# Patient Record
Sex: FEMALE | Race: BLACK OR AFRICAN AMERICAN | NOT HISPANIC OR LATINO
[De-identification: names, ages, dates, MRNs, and addresses within clinical notes are randomized per-mention and may not be internally consistent; named-entity substitution may affect disease eponyms.]

---

## 2018-06-07 ENCOUNTER — NON-APPOINTMENT (OUTPATIENT)
Age: 63
End: 2018-06-07

## 2018-06-07 ENCOUNTER — APPOINTMENT (OUTPATIENT)
Dept: CARDIOLOGY | Facility: CLINIC | Age: 63
End: 2018-06-07
Payer: MEDICAID

## 2018-06-07 VITALS
WEIGHT: 293 LBS | BODY MASS INDEX: 55.32 KG/M2 | SYSTOLIC BLOOD PRESSURE: 130 MMHG | OXYGEN SATURATION: 97 % | DIASTOLIC BLOOD PRESSURE: 80 MMHG | RESPIRATION RATE: 14 BRPM | HEIGHT: 61 IN | HEART RATE: 60 BPM

## 2018-06-07 VITALS — WEIGHT: 293 LBS | HEIGHT: 61 IN | BODY MASS INDEX: 55.32 KG/M2

## 2018-06-07 DIAGNOSIS — Z87.891 PERSONAL HISTORY OF NICOTINE DEPENDENCE: ICD-10-CM

## 2018-06-07 DIAGNOSIS — E66.01 MORBID (SEVERE) OBESITY DUE TO EXCESS CALORIES: ICD-10-CM

## 2018-06-07 DIAGNOSIS — Z87.898 PERSONAL HISTORY OF OTHER SPECIFIED CONDITIONS: ICD-10-CM

## 2018-06-07 PROCEDURE — 99214 OFFICE O/P EST MOD 30 MIN: CPT

## 2018-06-07 PROCEDURE — 93306 TTE W/DOPPLER COMPLETE: CPT

## 2018-06-07 PROCEDURE — 93000 ELECTROCARDIOGRAM COMPLETE: CPT

## 2018-06-07 RX ORDER — ALBUTEROL SULFATE 90 UG/1
108 AEROSOL, METERED RESPIRATORY (INHALATION) EVERY 6 HOURS
Refills: 0 | Status: ACTIVE | COMMUNITY

## 2018-09-11 ENCOUNTER — EMERGENCY (EMERGENCY)
Facility: HOSPITAL | Age: 63
LOS: 1 days | Discharge: HOME | End: 2018-09-11
Attending: EMERGENCY MEDICINE | Admitting: EMERGENCY MEDICINE
Payer: MEDICAID

## 2018-09-11 ENCOUNTER — APPOINTMENT (OUTPATIENT)
Dept: CARDIOLOGY | Facility: CLINIC | Age: 63
End: 2018-09-11
Payer: MEDICAID

## 2018-09-11 ENCOUNTER — NON-APPOINTMENT (OUTPATIENT)
Age: 63
End: 2018-09-11

## 2018-09-11 VITALS
RESPIRATION RATE: 18 BRPM | SYSTOLIC BLOOD PRESSURE: 126 MMHG | TEMPERATURE: 99 F | OXYGEN SATURATION: 99 % | HEART RATE: 90 BPM | DIASTOLIC BLOOD PRESSURE: 99 MMHG

## 2018-09-11 VITALS
TEMPERATURE: 98 F | SYSTOLIC BLOOD PRESSURE: 115 MMHG | OXYGEN SATURATION: 100 % | RESPIRATION RATE: 18 BRPM | DIASTOLIC BLOOD PRESSURE: 72 MMHG | HEART RATE: 80 BPM

## 2018-09-11 VITALS
HEART RATE: 87 BPM | BODY MASS INDEX: 43.99 KG/M2 | SYSTOLIC BLOOD PRESSURE: 80 MMHG | WEIGHT: 233 LBS | DIASTOLIC BLOOD PRESSURE: 52 MMHG | HEIGHT: 61 IN

## 2018-09-11 VITALS — SYSTOLIC BLOOD PRESSURE: 136 MMHG | DIASTOLIC BLOOD PRESSURE: 98 MMHG

## 2018-09-11 VITALS — SYSTOLIC BLOOD PRESSURE: 90 MMHG | DIASTOLIC BLOOD PRESSURE: 62 MMHG

## 2018-09-11 DIAGNOSIS — Z98.84 BARIATRIC SURGERY STATUS: Chronic | ICD-10-CM

## 2018-09-11 DIAGNOSIS — Z86.79 PERSONAL HISTORY OF OTHER DISEASES OF THE CIRCULATORY SYSTEM: ICD-10-CM

## 2018-09-11 DIAGNOSIS — Z01.818 ENCOUNTER FOR OTHER PREPROCEDURAL EXAMINATION: ICD-10-CM

## 2018-09-11 LAB
ALBUMIN SERPL ELPH-MCNC: 3.7 G/DL — SIGNIFICANT CHANGE UP (ref 3.3–5)
ALP SERPL-CCNC: 44 U/L — SIGNIFICANT CHANGE UP (ref 40–120)
ALT FLD-CCNC: 22 U/L — SIGNIFICANT CHANGE UP (ref 4–33)
AST SERPL-CCNC: 49 U/L — HIGH (ref 4–32)
BASE EXCESS BLDV CALC-SCNC: 3.8 MMOL/L — SIGNIFICANT CHANGE UP
BASOPHILS # BLD AUTO: 0.02 K/UL — SIGNIFICANT CHANGE UP (ref 0–0.2)
BASOPHILS NFR BLD AUTO: 0.3 % — SIGNIFICANT CHANGE UP (ref 0–2)
BILIRUB SERPL-MCNC: 1.5 MG/DL — HIGH (ref 0.2–1.2)
BLOOD GAS VENOUS - CREATININE: 1.86 MG/DL — HIGH (ref 0.5–1.3)
BUN SERPL-MCNC: 36 MG/DL — HIGH (ref 7–23)
CALCIUM SERPL-MCNC: 9.6 MG/DL — SIGNIFICANT CHANGE UP (ref 8.4–10.5)
CHLORIDE BLDV-SCNC: 115 MMOL/L — HIGH (ref 96–108)
CHLORIDE SERPL-SCNC: 109 MMOL/L — HIGH (ref 98–107)
CO2 SERPL-SCNC: 25 MMOL/L — SIGNIFICANT CHANGE UP (ref 22–31)
CREAT SERPL-MCNC: 1.92 MG/DL — HIGH (ref 0.5–1.3)
EOSINOPHIL # BLD AUTO: 0.01 K/UL — SIGNIFICANT CHANGE UP (ref 0–0.5)
EOSINOPHIL NFR BLD AUTO: 0.1 % — SIGNIFICANT CHANGE UP (ref 0–6)
GAS PNL BLDV: 150 MMOL/L — HIGH (ref 136–146)
GLUCOSE BLDV-MCNC: 86 — SIGNIFICANT CHANGE UP (ref 70–99)
GLUCOSE SERPL-MCNC: 86 MG/DL — SIGNIFICANT CHANGE UP (ref 70–99)
HCO3 BLDV-SCNC: 26 MMOL/L — SIGNIFICANT CHANGE UP (ref 20–27)
HCT VFR BLD CALC: 45.2 % — HIGH (ref 34.5–45)
HCT VFR BLDV CALC: 46 % — HIGH (ref 34.5–45)
HGB BLD-MCNC: 14.5 G/DL — SIGNIFICANT CHANGE UP (ref 11.5–15.5)
HGB BLDV-MCNC: 15 G/DL — SIGNIFICANT CHANGE UP (ref 11.5–15.5)
IMM GRANULOCYTES # BLD AUTO: 0.01 # — SIGNIFICANT CHANGE UP
IMM GRANULOCYTES NFR BLD AUTO: 0.1 % — SIGNIFICANT CHANGE UP (ref 0–1.5)
LACTATE BLDV-MCNC: 3.4 MMOL/L — HIGH (ref 0.5–2)
LYMPHOCYTES # BLD AUTO: 2.2 K/UL — SIGNIFICANT CHANGE UP (ref 1–3.3)
LYMPHOCYTES # BLD AUTO: 32.4 % — SIGNIFICANT CHANGE UP (ref 13–44)
MCHC RBC-ENTMCNC: 31.3 PG — SIGNIFICANT CHANGE UP (ref 27–34)
MCHC RBC-ENTMCNC: 32.1 % — SIGNIFICANT CHANGE UP (ref 32–36)
MCV RBC AUTO: 97.4 FL — SIGNIFICANT CHANGE UP (ref 80–100)
MONOCYTES # BLD AUTO: 0.98 K/UL — HIGH (ref 0–0.9)
MONOCYTES NFR BLD AUTO: 14.5 % — HIGH (ref 2–14)
NEUTROPHILS # BLD AUTO: 3.56 K/UL — SIGNIFICANT CHANGE UP (ref 1.8–7.4)
NEUTROPHILS NFR BLD AUTO: 52.6 % — SIGNIFICANT CHANGE UP (ref 43–77)
NRBC # FLD: 0.02 — SIGNIFICANT CHANGE UP
PCO2 BLDV: 46 MMHG — SIGNIFICANT CHANGE UP (ref 41–51)
PH BLDV: 7.4 PH — SIGNIFICANT CHANGE UP (ref 7.32–7.43)
PLATELET # BLD AUTO: 133 K/UL — LOW (ref 150–400)
PMV BLD: 13.9 FL — HIGH (ref 7–13)
PO2 BLDV: 36 MMHG — SIGNIFICANT CHANGE UP (ref 35–40)
POTASSIUM BLDV-SCNC: 2.9 MMOL/L — CRITICAL LOW (ref 3.4–4.5)
POTASSIUM SERPL-MCNC: 3.2 MMOL/L — LOW (ref 3.5–5.3)
POTASSIUM SERPL-SCNC: 3.2 MMOL/L — LOW (ref 3.5–5.3)
PROT SERPL-MCNC: 7.8 G/DL — SIGNIFICANT CHANGE UP (ref 6–8.3)
RBC # BLD: 4.64 M/UL — SIGNIFICANT CHANGE UP (ref 3.8–5.2)
RBC # FLD: 17.4 % — HIGH (ref 10.3–14.5)
SAO2 % BLDV: 61.7 % — SIGNIFICANT CHANGE UP (ref 60–85)
SODIUM SERPL-SCNC: 154 MMOL/L — HIGH (ref 135–145)
TROPONIN T, HIGH SENSITIVITY: 42 NG/L — SIGNIFICANT CHANGE UP (ref ?–14)
WBC # BLD: 6.78 K/UL — SIGNIFICANT CHANGE UP (ref 3.8–10.5)
WBC # FLD AUTO: 6.78 K/UL — SIGNIFICANT CHANGE UP (ref 3.8–10.5)

## 2018-09-11 PROCEDURE — 36000 PLACE NEEDLE IN VEIN: CPT

## 2018-09-11 PROCEDURE — 99285 EMERGENCY DEPT VISIT HI MDM: CPT | Mod: 25

## 2018-09-11 PROCEDURE — 93000 ELECTROCARDIOGRAM COMPLETE: CPT

## 2018-09-11 PROCEDURE — 93010 ELECTROCARDIOGRAM REPORT: CPT

## 2018-09-11 PROCEDURE — 99214 OFFICE O/P EST MOD 30 MIN: CPT

## 2018-09-11 PROCEDURE — 74176 CT ABD & PELVIS W/O CONTRAST: CPT | Mod: 26

## 2018-09-11 PROCEDURE — 76937 US GUIDE VASCULAR ACCESS: CPT | Mod: 26,LT

## 2018-09-11 RX ORDER — SODIUM CHLORIDE 9 MG/ML
1000 INJECTION INTRAMUSCULAR; INTRAVENOUS; SUBCUTANEOUS ONCE
Qty: 0 | Refills: 0 | Status: COMPLETED | OUTPATIENT
Start: 2018-09-11 | End: 2018-09-11

## 2018-09-11 RX ORDER — ONDANSETRON 8 MG/1
4 TABLET, FILM COATED ORAL ONCE
Qty: 0 | Refills: 0 | Status: COMPLETED | OUTPATIENT
Start: 2018-09-11 | End: 2018-09-11

## 2018-09-11 RX ORDER — POTASSIUM CHLORIDE 20 MEQ
40 PACKET (EA) ORAL ONCE
Qty: 0 | Refills: 0 | Status: COMPLETED | OUTPATIENT
Start: 2018-09-11 | End: 2018-09-11

## 2018-09-11 RX ORDER — METOPROLOL TARTRATE 25 MG/1
25 TABLET, FILM COATED ORAL TWICE DAILY
Refills: 0 | Status: DISCONTINUED | COMMUNITY
End: 2018-09-11

## 2018-09-11 RX ORDER — ENALAPRIL MALEATE 20 MG/1
20 TABLET ORAL DAILY
Refills: 0 | Status: DISCONTINUED | COMMUNITY
End: 2018-09-11

## 2018-09-11 RX ADMIN — SODIUM CHLORIDE 1000 MILLILITER(S): 9 INJECTION INTRAMUSCULAR; INTRAVENOUS; SUBCUTANEOUS at 15:45

## 2018-09-11 RX ADMIN — SODIUM CHLORIDE 1000 MILLILITER(S): 9 INJECTION INTRAMUSCULAR; INTRAVENOUS; SUBCUTANEOUS at 18:36

## 2018-09-11 RX ADMIN — ONDANSETRON 4 MILLIGRAM(S): 8 TABLET, FILM COATED ORAL at 15:45

## 2018-09-11 NOTE — ED PROVIDER NOTE - DATE/TIME 5
FACULTY SIGN-OUT  ADDENDUM     Care of this patient was assumed from Dr. Pebbles Nicole. The patient was seen for Fatigue and Fall  . The patient's initial evaluation and plan have been discussed with the prior provider who initially evaluated the patient. Nursing Notes, Past Medical Hx, Past Surgical Hx, Social Hx, Allergies, and Family Hx were all reviewed. CHIEF COMPLAINT       Chief Complaint   Patient presents with    Fatigue    Fall         PAST MEDICAL HISTORY    has a past medical history of Atrial fibrillation ([address] CAD (coronary artery disease); Esophageal reflux; Other and unspecified hyperlipidemia; Other primary cardiomyopathies; Type II or unspecified type diabetes mellitus without mention of complication, not stated as uncontrolled; Unspecified asthma; and Unspecified essential hypertension. SURGICAL HISTORY      has a past surgical history that includes Coronary artery bypass graft and pacemaker placement.     CURRENT MEDICATIONS       Previous Medications    ACETAMINOPHEN-CODEINE (TYLENOL #3) 300-30 MG PER TABLET        BUDESONIDE-FORMOTEROL (SYMBICORT) 160-4.5 MCG/ACT AERO    Inhale 2 puffs into the lungs 2 times daily    CALCIUM CARBONATE-VITAMIN D (CALCIUM + D PO)    Take by mouth    CARVEDILOL (COREG) 6.25 MG TABLET    TAKE (1) TABLET EVERY MORNING AND EVENING    CHOLECALCIFEROL (VITAMIN D3) 2000 UNITS CAPS    Take by mouth    DOCUSATE SODIUM (COLACE) 100 MG CAPSULE    Take 100 mg by mouth as needed for Constipation    ELIQUIS 5 MG TABS TABLET    TAKE (1) TABLET EVERY MORNING AND EVENING    FLUTICASONE (FLONASE) 50 MCG/ACT NASAL SPRAY    1 spray by Nasal route daily    KETOCONAZOLE (NIZORAL) 2 % CREAM        MOMETASONE-FORMOTEROL (DULERA) 200-5 MCG/ACT INHALER    Inhale 2 puffs into the lungs every 12 hours    OMEPRAZOLE (PRILOSEC) 20 MG DELAYED RELEASE CAPSULE        PRAVASTATIN (PRAVACHOL) 80 MG TABLET        SPIRONOLACTONE (ALDACTONE) 25 MG TABLET           ALLERGIES     is allergic evidence of an acute infarct. Encephalomalacia along the anterior right centrum semi ovale suggesting remote infarct. There is no evidence of hydrocephalus. ORBITS: The visualized portion of the orbits demonstrate no acute abnormality. SINUSES: Moderate mucosal thickening of the left sphenoid sinus and mild mucosal thickening within the right frontal sinus suggesting chronic sinusitis. SOFT TISSUES/SKULL:  No acute abnormality of the visualized skull or soft tissues. No acute intracranial abnormality with chronic findings as described. Ct Chest W Contrast    Result Date: 10/1/2017  EXAMINATION: CT OF THE CHEST WITH CONTRAST, 10/1/2017 6:18 pm TECHNIQUE: CT of the chest was performed with the administration of intravenous contrast. Multiplanar reformatted images are provided for review. Dose modulation, iterative reconstruction, and/or weight based adjustment of the mA/kV was utilized to reduce the radiation dose to as low as reasonably achievable. COMPARISON: None HISTORY: ORDERING SYSTEM PROVIDED HISTORY: possible mass on xray TECHNOLOGIST PROVIDED HISTORY: Ordering Physician Provided Reason for Exam: Possibly mass found on X-ray chest. Patient c/o mild SOB/ fatigue and frequent falls Acuity: Acute Type of Exam: Initial FINDINGS: Mediastinum: Heart is enlarged. Status post CABG. Thoracic aorta is unremarkable. No enlarged mediastinal lymph nodes. Lungs/pleura: No pneumothorax. No pleural effusion. No focal consolidation. Central airways are patent. Upper Abdomen: Cholecystectomy. Otherwise, within normal limits. Soft Tissues/Bones: No acute finding. 1. No focal airspace disease. No pulmonary mass identified. 2. Cardiomegaly. Xr Chest Portable    Result Date: 10/1/2017  EXAMINATION: SINGLE VIEW OF THE CHEST 10/1/2017 5:01 pm COMPARISON: None.  HISTORY: ORDERING SYSTEM PROVIDED HISTORY: falls TECHNOLOGIST PROVIDED HISTORY: Reason for exam:->falls Ordering Physician Provided Reason for Exam: pt c/o fatigue Acuity: Acute Type of Exam: Initial Additional signs and symptoms: mild sob FINDINGS: There is a bipolar pacer on the left. Sternotomy wires are noted. There is mild cardiomegaly. There is fullness of the superior mediastinum on the right. The bony thorax is intact. Possible superior mediastinal mass. Recommend follow-up CT chest with IV contrast. Status post CABG and pacer.          LABS:  Results for orders placed or performed during the hospital encounter of 10/01/17   CBC Auto Differential   Result Value Ref Range    WBC 6.1 3.5 - 11.0 k/uL    RBC 4.29 4.0 - 5.2 m/uL    Hemoglobin 13.2 12.0 - 16.0 g/dL    Hematocrit 39.2 36 - 46 %    MCV 91.4 80 - 100 fL    MCH 30.9 26 - 34 pg    MCHC 33.8 31 - 37 g/dL    RDW 13.5 12.5 - 15.4 %    Platelets 545 747 - 867 k/uL    MPV 7.9 6.0 - 12.0 fL    Differential Type NOT REPORTED     Seg Neutrophils 61 %    Lymphocytes 25 %    Monocytes 11 %    Eosinophils % 2 %    Basophils 1 %    Segs Absolute 3.70 1.8 - 7.7 k/uL    Absolute Lymph # 1.50 1.0 - 4.8 k/uL    Absolute Mono # 0.60 0.1 - 1.2 k/uL    Absolute Eos # 0.10 0.0 - 0.4 k/uL    Basophils # 0.10 0.0 - 0.2 k/uL    WBC Morphology NOT REPORTED     RBC Morphology NOT REPORTED     Platelet Estimate NOT REPORTED    Basic Metabolic Panel   Result Value Ref Range    Glucose 116 (H) 70 - 99 mg/dL    BUN 20 8 - 23 mg/dL    CREATININE 0.80 0.50 - 0.90 mg/dL    Bun/Cre Ratio NOT REPORTED 9 - 20    Calcium 9.8 8.6 - 10.4 mg/dL    Sodium 139 135 - 144 mmol/L    Potassium 4.3 3.7 - 5.3 mmol/L    Chloride 97 (L) 98 - 107 mmol/L    CO2 29 20 - 31 mmol/L    Anion Gap 13 9 - 17 mmol/L    GFR Non-African American >60 >60 mL/min    GFR African American >60 >60 mL/min    GFR Comment          GFR Staging NOT REPORTED    Protime-INR   Result Value Ref Range    Protime 11.3 9.4 - 12.6 sec    INR 1.1    APTT   Result Value Ref Range    PTT 28.8 21.3 - 31.3 sec   Troponin   Result Value Ref Range    Troponin T <0.03 <0.03 ng/mL with a voice recognition program.  Efforts were made to edit the dictations but occasionally words are mis-transcribed.)        Kleber Richardson D.O.   Emergency Medicine Attending       Adele Goldberg DO  10/01/17 [id number] 12-Sep-2018 00:51

## 2018-09-11 NOTE — ED PROVIDER NOTE - MEDICAL DECISION MAKING DETAILS
Patient is 63 year old female with recent gastric sleeve presenting with hypotension and lightheadedness. Consistent with hypovolemia likely due to dehydration, but need to evaluate for any other causes, such as bleed, sepsis, or surgical cx. Give IVF and order basic labs to evaluate for infection and electrolyte status. Give zofran for antinausea. Will Reassess. Surgery should be contacted for further dispo

## 2018-09-11 NOTE — ED PROVIDER NOTE - ATTENDING CONTRIBUTION TO CARE
DR. COX, ATTENDING MD-  I performed a face to face bedside interview with patient regarding history of present illness, review of symptoms and past medical history. I completed an independent physical exam.  I have discussed patient's plan of care with the resident.   Documentation as above in the note.    64 y/o female h/o gastric sleeve few months prior at [facility] here from cardiologist's office with transient hypotension there and c/o n/v daily since her surgery.  States she has been f/u with her bariatric surgeon every few weeks d/t the n/v and told it was an expected se of her sx.  Denies f/c, ha, neck stiffness, cp, sob, cough, diarrhea, dysuria, rash.  Afebrile, vs wnl, dry mm, anxious, obese, ctabil, s1s2 rrr no m/r/g, abd soft mild epig ttp no r/g, no cva tenderness b/l, no leg swelling b/l, no rash.  Transient low bp at office, normotensive en route by EMS and here at [facility] ED.  Likely hypovolemia d/t dec po intake and vomiting.  Eval for complication of gastric sleeve, lyte abn.  Obtain cbc, cmp, ct a/p, cxr, ua, give ivf bolus, antiemetic, reassess.  Will need to pass po challenge prior to dc.

## 2018-09-11 NOTE — ED PROVIDER NOTE - OBJECTIVE STATEMENT
Patient is 63 y female with recent bariatric surgery. Patient is 63 y female with recent bariatric surgery presenting with hypotension and lightheadedness today. Patient was a cardiology f/u where BP was measuring 80s/40s and was sent to ED for further evaluation. Patient also has felt lightheaded and cold. She had recent bariatric surgery in July where she lost 70 lbs, and has had minimal PO intake since then. She also endorses several episodes of vomiting today, and has vomiting occasionally every day. Denies any CP, SOB, or LOC.     PMHx of MI/angina with flipped T waves. Patient is 63 y female with recent bariatric surgery presenting with hypotension and lightheadedness today. Patient was at cardiology f/u where BP was measuring 80s/40s and was sent to ED for further evaluation. Patient also has felt lightheaded and cold. She had recent bariatric surgery in July where she lost 70 lbs, and has had minimal PO intake since then. She also endorses several episodes of vomiting today, and has vomiting occasionally every day. Denies any CP, SOB, or LOC.     PMHx of MI/angina with flipped T waves. Patient is 63 y female with recent bariatric surgery presenting with hypotension and lightheadedness today. Patient was at cardiology f/u where BP was measuring 80s/40s and was sent to ED for further evaluation. Patient also has felt lightheaded and cold. She had recent bariatric surgery in July where she lost 70 lbs, and has had minimal PO intake since then. She also endorses several episodes of vomiting today, and has vomiting occasionally every day. Denies any CP, SOB, or LOC.     PMHx of MI/angina with flipped T waves, Cardiologist states unchanged from baseline.

## 2018-09-11 NOTE — ED ADULT TRIAGE NOTE - CHIEF COMPLAINT QUOTE
Pt brought in by EMS from cardiologist office for hypotension, as per staff at office pts BP was 80/57, on ems arrival /92. Pt denies chest pain, sob, n/v/d, fever or chills.

## 2018-09-11 NOTE — ED PROCEDURE NOTE - PROCEDURE ADDITIONAL DETAILS
20 gauge IV placed under ultrasound guidance using clean conditions after location of vein with b-mode ultrasound. Vascular access performed using dynamic guidance with direct visualization of needle entering vein and location and patency ascertained by direct visualization of catheter in vein and functional catheter.  US performed after to ascertain fro complications. Successful peripheral venous access performed in left AC. No complications.  See image(s) and report in chart.  [place] [id number]

## 2018-09-11 NOTE — ED PROVIDER NOTE - PROGRESS NOTE DETAILS
Erik Avila PGY1  Spoke to Cardiologist Dr. Bergman who said EKG was unchanged Erik Avila PGY1  Patient declined any oral meds, including oral or IV potassium. Erik Avila PGY1  Patient declined any oral meds, including oral or IV potassium, but understands risk of cardiac dysrhythmia with low potassium. Erik Avila PGY1  Spoke to surgery, states patient requires transfer for admission, as she is a bariatric patient. MD COX:  Pt states she continues to feel nauseated, will try to take some apple juice.  Pending CT and likely xfer to [place] for dec po intake, shonna, lyte abn.  Pt agrees to xfer. Discussed case with Dr. Back [facility] surgeon who instructs to transfer to [facility].  Patient refusing transfer to [facility].  Patient needs admission for electrolyte repletion and Nadya.  Discussed case with Dr. Robison and Dr. Moctezuma.

## 2018-09-12 ENCOUNTER — INPATIENT (INPATIENT)
Facility: HOSPITAL | Age: 63
LOS: 0 days | Discharge: LEFT BEFORE DISCHARGE | DRG: 392 | End: 2018-09-13
Attending: INTERNAL MEDICINE | Admitting: STUDENT IN AN ORGANIZED HEALTH CARE EDUCATION/TRAINING PROGRAM
Payer: MEDICAID

## 2018-09-12 VITALS
SYSTOLIC BLOOD PRESSURE: 91 MMHG | OXYGEN SATURATION: 98 % | TEMPERATURE: 98 F | HEART RATE: 65 BPM | DIASTOLIC BLOOD PRESSURE: 56 MMHG | RESPIRATION RATE: 18 BRPM

## 2018-09-12 VITALS
TEMPERATURE: 98 F | DIASTOLIC BLOOD PRESSURE: 68 MMHG | RESPIRATION RATE: 18 BRPM | OXYGEN SATURATION: 96 % | HEART RATE: 75 BPM | SYSTOLIC BLOOD PRESSURE: 133 MMHG

## 2018-09-12 DIAGNOSIS — E87.0 HYPEROSMOLALITY AND HYPERNATREMIA: ICD-10-CM

## 2018-09-12 DIAGNOSIS — N17.9 ACUTE KIDNEY FAILURE, UNSPECIFIED: ICD-10-CM

## 2018-09-12 DIAGNOSIS — R11.10 VOMITING, UNSPECIFIED: ICD-10-CM

## 2018-09-12 DIAGNOSIS — E87.6 HYPOKALEMIA: ICD-10-CM

## 2018-09-12 DIAGNOSIS — Z29.9 ENCOUNTER FOR PROPHYLACTIC MEASURES, UNSPECIFIED: ICD-10-CM

## 2018-09-12 DIAGNOSIS — I10 ESSENTIAL (PRIMARY) HYPERTENSION: ICD-10-CM

## 2018-09-12 DIAGNOSIS — Z90.3 ACQUIRED ABSENCE OF STOMACH [PART OF]: Chronic | ICD-10-CM

## 2018-09-12 DIAGNOSIS — Z98.84 BARIATRIC SURGERY STATUS: Chronic | ICD-10-CM

## 2018-09-12 LAB
ALBUMIN SERPL ELPH-MCNC: 3.4 G/DL — SIGNIFICANT CHANGE UP (ref 3.3–5)
ALP SERPL-CCNC: 39 U/L — LOW (ref 40–120)
ALT FLD-CCNC: 18 U/L — SIGNIFICANT CHANGE UP (ref 10–45)
ANION GAP SERPL CALC-SCNC: 16 MMOL/L — SIGNIFICANT CHANGE UP (ref 5–17)
APTT BLD: 24.2 SEC — LOW (ref 27.5–37.4)
AST SERPL-CCNC: 43 U/L — HIGH (ref 10–40)
BASOPHILS # BLD AUTO: 0.1 K/UL — SIGNIFICANT CHANGE UP (ref 0–0.2)
BASOPHILS NFR BLD AUTO: 1.1 % — SIGNIFICANT CHANGE UP (ref 0–2)
BILIRUB SERPL-MCNC: 1.3 MG/DL — HIGH (ref 0.2–1.2)
BLD GP AB SCN SERPL QL: NEGATIVE — SIGNIFICANT CHANGE UP
BUN SERPL-MCNC: 28 MG/DL — HIGH (ref 7–23)
CALCIUM SERPL-MCNC: 9 MG/DL — SIGNIFICANT CHANGE UP (ref 8.4–10.5)
CHLORIDE SERPL-SCNC: 112 MMOL/L — HIGH (ref 96–108)
CO2 SERPL-SCNC: 25 MMOL/L — SIGNIFICANT CHANGE UP (ref 22–31)
CREAT SERPL-MCNC: 1.72 MG/DL — HIGH (ref 0.5–1.3)
EOSINOPHIL # BLD AUTO: 0.1 K/UL — SIGNIFICANT CHANGE UP (ref 0–0.5)
EOSINOPHIL NFR BLD AUTO: 1 % — SIGNIFICANT CHANGE UP (ref 0–6)
GLUCOSE SERPL-MCNC: 83 MG/DL — SIGNIFICANT CHANGE UP (ref 70–99)
HCT VFR BLD CALC: 41.9 % — SIGNIFICANT CHANGE UP (ref 34.5–45)
HGB BLD-MCNC: 14 G/DL — SIGNIFICANT CHANGE UP (ref 11.5–15.5)
INR BLD: 0.99 RATIO — SIGNIFICANT CHANGE UP (ref 0.88–1.16)
LYMPHOCYTES # BLD AUTO: 1.8 K/UL — SIGNIFICANT CHANGE UP (ref 1–3.3)
LYMPHOCYTES # BLD AUTO: 33.7 % — SIGNIFICANT CHANGE UP (ref 13–44)
MAGNESIUM SERPL-MCNC: 2.1 MG/DL — SIGNIFICANT CHANGE UP (ref 1.6–2.6)
MCHC RBC-ENTMCNC: 33 PG — SIGNIFICANT CHANGE UP (ref 27–34)
MCHC RBC-ENTMCNC: 33.4 GM/DL — SIGNIFICANT CHANGE UP (ref 32–36)
MCV RBC AUTO: 98.9 FL — SIGNIFICANT CHANGE UP (ref 80–100)
MONOCYTES # BLD AUTO: 0.7 K/UL — SIGNIFICANT CHANGE UP (ref 0–0.9)
MONOCYTES NFR BLD AUTO: 13.5 % — SIGNIFICANT CHANGE UP (ref 2–14)
NEUTROPHILS # BLD AUTO: 2.8 K/UL — SIGNIFICANT CHANGE UP (ref 1.8–7.4)
NEUTROPHILS NFR BLD AUTO: 50.7 % — SIGNIFICANT CHANGE UP (ref 43–77)
PLATELET # BLD AUTO: 117 K/UL — LOW (ref 150–400)
POTASSIUM SERPL-MCNC: 2.9 MMOL/L — CRITICAL LOW (ref 3.5–5.3)
POTASSIUM SERPL-SCNC: 2.9 MMOL/L — CRITICAL LOW (ref 3.5–5.3)
PROT SERPL-MCNC: 7.3 G/DL — SIGNIFICANT CHANGE UP (ref 6–8.3)
PROTHROM AB SERPL-ACNC: 10.8 SEC — SIGNIFICANT CHANGE UP (ref 9.8–12.7)
RBC # BLD: 4.24 M/UL — SIGNIFICANT CHANGE UP (ref 3.8–5.2)
RBC # FLD: 15.9 % — HIGH (ref 10.3–14.5)
RH IG SCN BLD-IMP: POSITIVE — SIGNIFICANT CHANGE UP
SODIUM SERPL-SCNC: 153 MMOL/L — HIGH (ref 135–145)
WBC # BLD: 5.4 K/UL — SIGNIFICANT CHANGE UP (ref 3.8–10.5)
WBC # FLD AUTO: 5.4 K/UL — SIGNIFICANT CHANGE UP (ref 3.8–10.5)

## 2018-09-12 PROCEDURE — 99223 1ST HOSP IP/OBS HIGH 75: CPT

## 2018-09-12 PROCEDURE — 99285 EMERGENCY DEPT VISIT HI MDM: CPT | Mod: 25

## 2018-09-12 RX ORDER — SODIUM CHLORIDE 9 MG/ML
1000 INJECTION, SOLUTION INTRAVENOUS
Qty: 0 | Refills: 0 | Status: DISCONTINUED | OUTPATIENT
Start: 2018-09-12 | End: 2018-09-13

## 2018-09-12 RX ORDER — CITALOPRAM 10 MG/1
20 TABLET, FILM COATED ORAL DAILY
Qty: 0 | Refills: 0 | Status: DISCONTINUED | OUTPATIENT
Start: 2018-09-12 | End: 2018-09-13

## 2018-09-12 RX ORDER — HYDROXYZINE HCL 10 MG
25 TABLET ORAL
Qty: 0 | Refills: 0 | Status: DISCONTINUED | OUTPATIENT
Start: 2018-09-12 | End: 2018-09-13

## 2018-09-12 RX ORDER — SUCRALFATE 1 G
1 TABLET ORAL ONCE
Qty: 0 | Refills: 0 | Status: COMPLETED | OUTPATIENT
Start: 2018-09-12 | End: 2018-09-12

## 2018-09-12 RX ORDER — BUDESONIDE AND FORMOTEROL FUMARATE DIHYDRATE 160; 4.5 UG/1; UG/1
2 AEROSOL RESPIRATORY (INHALATION)
Qty: 0 | Refills: 0 | Status: DISCONTINUED | OUTPATIENT
Start: 2018-09-12 | End: 2018-09-13

## 2018-09-12 RX ORDER — HEPARIN SODIUM 5000 [USP'U]/ML
5000 INJECTION INTRAVENOUS; SUBCUTANEOUS EVERY 8 HOURS
Qty: 0 | Refills: 0 | Status: DISCONTINUED | OUTPATIENT
Start: 2018-09-12 | End: 2018-09-13

## 2018-09-12 RX ORDER — ATORVASTATIN CALCIUM 80 MG/1
40 TABLET, FILM COATED ORAL AT BEDTIME
Qty: 0 | Refills: 0 | Status: DISCONTINUED | OUTPATIENT
Start: 2018-09-12 | End: 2018-09-13

## 2018-09-12 RX ORDER — DOCUSATE SODIUM 100 MG
100 CAPSULE ORAL
Qty: 0 | Refills: 0 | Status: DISCONTINUED | OUTPATIENT
Start: 2018-09-12 | End: 2018-09-13

## 2018-09-12 RX ORDER — PANTOPRAZOLE SODIUM 20 MG/1
40 TABLET, DELAYED RELEASE ORAL
Qty: 0 | Refills: 0 | Status: DISCONTINUED | OUTPATIENT
Start: 2018-09-12 | End: 2018-09-13

## 2018-09-12 RX ORDER — SUCRALFATE 1 G
1 TABLET ORAL
Qty: 0 | Refills: 0 | Status: DISCONTINUED | OUTPATIENT
Start: 2018-09-12 | End: 2018-09-13

## 2018-09-12 RX ORDER — POTASSIUM CHLORIDE 20 MEQ
10 PACKET (EA) ORAL
Qty: 0 | Refills: 0 | Status: DISCONTINUED | OUTPATIENT
Start: 2018-09-12 | End: 2018-09-13

## 2018-09-12 RX ORDER — FOLIC ACID 0.8 MG
1 TABLET ORAL DAILY
Qty: 0 | Refills: 0 | Status: DISCONTINUED | OUTPATIENT
Start: 2018-09-12 | End: 2018-09-13

## 2018-09-12 RX ORDER — RISPERIDONE 4 MG/1
1 TABLET ORAL
Qty: 0 | Refills: 0 | Status: DISCONTINUED | OUTPATIENT
Start: 2018-09-12 | End: 2018-09-13

## 2018-09-12 RX ORDER — ONDANSETRON 8 MG/1
4 TABLET, FILM COATED ORAL ONCE
Qty: 0 | Refills: 0 | Status: COMPLETED | OUTPATIENT
Start: 2018-09-12 | End: 2018-09-12

## 2018-09-12 RX ORDER — THIAMINE MONONITRATE (VIT B1) 100 MG
100 TABLET ORAL ONCE
Qty: 0 | Refills: 0 | Status: COMPLETED | OUTPATIENT
Start: 2018-09-12 | End: 2018-09-12

## 2018-09-12 RX ADMIN — SODIUM CHLORIDE 125 MILLILITER(S): 9 INJECTION, SOLUTION INTRAVENOUS at 08:12

## 2018-09-12 RX ADMIN — Medication 100 MILLIGRAM(S): at 08:13

## 2018-09-12 RX ADMIN — Medication 1 GRAM(S): at 15:57

## 2018-09-12 RX ADMIN — Medication 100 MILLIEQUIVALENT(S): at 12:50

## 2018-09-12 RX ADMIN — Medication 1 GRAM(S): at 01:18

## 2018-09-12 RX ADMIN — SODIUM CHLORIDE 150 MILLILITER(S): 9 INJECTION, SOLUTION INTRAVENOUS at 04:26

## 2018-09-12 RX ADMIN — HEPARIN SODIUM 5000 UNIT(S): 5000 INJECTION INTRAVENOUS; SUBCUTANEOUS at 18:55

## 2018-09-12 NOTE — CONSULT NOTE ADULT - SUBJECTIVE AND OBJECTIVE BOX
GENERAL SURGERY CONSULT NOTE  --------------------------------------------------------------------------------------------    HPI:  63F, non-compliant with history & physical. Upon encountering her the patient stated to me that she had answered the same questions too many times today, and would not answer my questions or allow me to examen her.    Per her recent ED visit at [facility]...  63F with recent sleeve gastrectomy (7/18 at [facility]) presenting with lightheadedness and inability to tolerate PO for months. Patient was at cardiology f/u where BP was measuring 80s/40s and was sent to ED for further evaluation. Patient also has felt lightheaded and cold. She reports having lost 70 lbs, and has had minimal PO intake since then. She also endorses several episodes of vomiting today, and has vomiting occasionally every day. Denies any CP, SOB, or LOC.     ROS: 10-system review is otherwise negative except HPI above.      PAST MEDICAL & SURGICAL HISTORY:    FAMILY HISTORY:  [] Family history not pertinent as reviewed with the patient and family      ALLERGIES: tetracycline (Hives)      CURRENT MEDICATIONS  MEDICATIONS (STANDING): lactated ringers. 1000 milliLiter(s) IV Continuous <Continuous>    MEDICATIONS (PRN):  --------------------------------------------------------------------------------------------    Vitals:   T(C): 36.6 (09-12-18 @ 03:38), Max: 36.6 (09-12-18 @ 03:38)  HR: 65 (09-12-18 @ 03:38) (65 - 65)  BP: 91/56 (09-12-18 @ 03:38) (91/56 - 91/56)  RR: 18 (09-12-18 @ 03:38) (18 - 18)  SpO2: 98% (09-12-18 @ 03:38) (98% - 98%)  CAPILLARY BLOOD GLUCOSE      PHYSICAL EXAM:  Patient declined to be examined   --------------------------------------------------------------------------------------------    LABS    From [facility]...    WBC 6.8  Hgb 14.5  Hct 45  Plt 133    Na 154  K 3.2  Cl 109  CO2 25  BUN 36  Cr 1.92          --------------------------------------------------------------------------------------------    MICROBIOLOGY      --------------------------------------------------------------------------------------------    IMAGING  CT a/p done at [facility], images reviewed, no acute pathology.

## 2018-09-12 NOTE — ED PROVIDER NOTE - OBJECTIVE STATEMENT
63yof s/p gastric sleeve in July 2018 at [facility] has had intractable nausea and vomiting for weeks to months, was sent to [facility] from PMD office for hypotension. Had BPs in the 80s and acute kidney injury, presumed from dehydration. Transferred to [facility] for admission and bariatrics consult. Pt denies any pain but endorses significant nausea and abdominal fullness w/ any PO intake. No chest pain shortness of breath or syncope.

## 2018-09-12 NOTE — DISCHARGE NOTE ADULT - PLAN OF CARE
Continue your home blood pressure medications   and follow up with your primary care doctor Nausea and Vomiting will be resolved FOLLOW UP WITH YOUR GASTROENTEROLOGIST AND BARIATRIC SURGEON AND PRIMARY DOCTOR AS SOON AS POSSIBLE THIS WEEK. Blood pressure will be controlled

## 2018-09-12 NOTE — CONSULT NOTE ADULT - ASSESSMENT
ASSESSMENT: Patient is a 63F with inability to tolerate much PO and lightheadedness for months since sleeve gastrectomy done in July of this year.    PLAN:   - No surgical interventions indicated at this time  - Would recommend patient follow up with her surgeon    Will discuss with MIS fellow    Jose Virk, PGY-2  [facility] [phone]
Impression:  1. Nausea, vomiting, inability to tolerate PO diet - DDx: stenosis of sleeve gastrectomy, PUD  2. Recent lap sleeve gastrectomy July 2018    Plan:  - Obtain UGIS; pending results will consider EGD  - IV fluids  - Monitor electrolytes and correct derangements  - Diet per Surgery  - Discussed with GI attending

## 2018-09-12 NOTE — DISCHARGE NOTE ADULT - CARE PLAN
Principal Discharge DX:	Vomiting, intractability of vomiting not specified, presence of nausea not specified, unspecified vomiting type  Goal:	Nausea and Vomiting will be resolved  Assessment and plan of treatment:	FOLLOW UP WITH YOUR GASTROENTEROLOGIST AND BARIATRIC SURGEON AND PRIMARY DOCTOR AS SOON AS POSSIBLE THIS WEEK.  Secondary Diagnosis:	Essential hypertension  Goal:	Blood pressure will be controlled  Assessment and plan of treatment:	Continue your home blood pressure medications   and follow up with your primary care doctor

## 2018-09-12 NOTE — H&P ADULT - HISTORY OF PRESENT ILLNESS
63F h/o HTN, CAD (no intervention) p/w nausea, vomiting, and decreased po intake since gastric sleeve surgery 7/2018. patient states she can only tolerate clear liquids. 63F h/o HTN, CAD (no intervention) p/w nausea, vomiting, and decreased po intake since gastric sleeve surgery 7/2018. patient states she can only tolerate some liquids at small amounts at a time. patient has some associated epigastric abdominal pain described as cramping and heart burn. some nausea now and one episode of vomiting overnight. no fevers/chills. occasional constipation. patient states that she had low blood pressure upon follow up at doctors office and was told to stop her bp medications. patient initially presented to OSH with symptoms and bariatric surgery requested transfer to [facility] for evaluation here.

## 2018-09-12 NOTE — ED ADULT NURSE NOTE - OBJECTIVE STATEMENT
64 y/o F patient presents to ED from [facility] for consult. As 62 y/o F patient presents to ED from [facility] for consult. As per patient, she was at PCP office when she began to feel dizzy and began to complain of hypotension. As per EMS, patient was not hypotensive upon their arrival. As per patient's charts, patient had multiple episodes of vomiting and complaints of nausea at [facility]. Patient A&Ox3. patient refusing to give complete history of events and states "im too tired to explain the same thing over again." abdomen distended. tender to touch upon palpation in epigastric area. Patient denies HA, dizziness, SOB, chest pain, N/V/D, fevers/chills. Safety and comfort measures provided and maintained. MD bedside.

## 2018-09-12 NOTE — CONSULT NOTE ADULT - ATTENDING COMMENTS
I saw and examined the patient, and reviewed  the history and data with the patient and staff  Agree with note which was also reviewed and edited where appropriate.  D/W patient, RN, residents and Fellow    needs [facility] for evaluation and possible treatment.  d/w pt and Dr Brown

## 2018-09-12 NOTE — H&P ADULT - NSHPPHYSICALEXAM_GEN_ALL_CORE
Vital Signs Last 24 Hrs  T(C): 36.9 (12 Sep 2018 12:34), Max: 36.9 (12 Sep 2018 12:34)  T(F): 98.5 (12 Sep 2018 12:34), Max: 98.5 (12 Sep 2018 12:34)  HR: 75 (12 Sep 2018 12:34) (60 - 75)  BP: 133/68 (12 Sep 2018 12:34) (91/56 - 133/68)  BP(mean): --  RR: 18 (12 Sep 2018 12:34) (18 - 18)  SpO2: 96% (12 Sep 2018 12:34) (96% - 98%)    PHYSICAL EXAM:  GENERAL: NAD, obese, dry lips, dry mucus membranes  EYES: conjunctiva and sclera clear  NECK: No JVD  CHEST/LUNG: CTA b/l  HEART: S1 S2 RRR  ABDOMEN: +BS Soft, epigastric tenderness  EXTREMITIES:  2+ DP Pulses, No c/c. no le edema  NEUROLOGY: AAOx3, no focal deficits   skin: no rash

## 2018-09-12 NOTE — ED ADULT NURSE NOTE - OBJECTIVE STATEMENT
Pt received in RW with report provided by previous shift RN. Pt received aaox3 with c/o hypotension at her Dr's office. In addition, the pt had complaints of lightheadedness and vomiting. States emesis was nbnb and chronic in nature since her gastric sleeve in 07/2018. Pt denies any cp or sob.

## 2018-09-12 NOTE — CONSULT NOTE ADULT - SUBJECTIVE AND OBJECTIVE BOX
Chief Complaint:  Patient is a 63y old  Female who presents with a chief complaint of     HPI:    Allergies:  tetracycline (Hives)      Home Medications:    Hospital Medications:  heparin  Injectable 5000 Unit(s) SubCutaneous every 8 hours  lactated ringers. 1000 milliLiter(s) IV Continuous <Continuous>  multivitamin/thiamine/folic acid in sodium chloride 0.9% 1000 milliLiter(s) IV Continuous <Continuous>  potassium chloride  10 mEq/100 mL IVPB 10 milliEquivalent(s) IV Intermittent every 1 hour  sucralfate suspension 1 Gram(s) Oral two times a day      PMHX/PSHX:  History of sleeve gastrectomy      Family history:      Social History:     Review of systems: Negative, except as otherwise noted above      PHYSICAL EXAM:   Vital Signs:  Vital Signs Last 24 Hrs  T(C): 36.9 (12 Sep 2018 12:34), Max: 36.9 (12 Sep 2018 12:34)  T(F): 98.5 (12 Sep 2018 12:34), Max: 98.5 (12 Sep 2018 12:34)  HR: 75 (12 Sep 2018 12:34) (60 - 75)  BP: 133/68 (12 Sep 2018 12:34) (91/56 - 133/68)  BP(mean): --  RR: 18 (12 Sep 2018 12:34) (18 - 18)  SpO2: 96% (12 Sep 2018 12:34) (96% - 98%)  Daily     Daily     GENERAL:  No acute distress  HEENT:  Anicteric, no thrush  CHEST:  Non-labored breathing, lungs clear b/l  HEART:  +s1, s2 heart sounds, no murmurs  ABDOMEN:    EXTREMITIES:  warm and well perfused, no edema  SKIN:    NEURO:          LABS:  CBC Full  -  ( 12 Sep 2018 09:18 )  WBC Count : 5.4 K/uL  Hemoglobin : 14.0 g/dL  Hematocrit : 41.9 %  Platelet Count - Automated : 117 K/uL  Mean Cell Volume : 98.9 fl  Auto Neutrophil # : 2.8 K/uL  Auto Neutrophil % : 50.7 %    09-12 @ 09:18  Na 153 mmol/L  K 2.9 mmol/L  Cl 112 mmol/L  CO2 25 mmol/L  BUN 28 mg/dL  Creat 1.72 mg/dL  Glucose 83 mg/dL  Ca 9.0 mg/dL    Total protein 7.3 g/dL  Albumin 3.4 g/dL  T bili 1.3 mg/dL  Alk phos 39 U/L  AST 43 U/L  ALT 18 U/L    PT/INR - ( 12 Sep 2018 09:18 )   PT: 10.8 sec;   INR: 0.99 ratio         PTT - ( 12 Sep 2018 09:18 )  PTT:24.2 sec      Imaging: Chief Complaint:  Patient is a 63y old  Female who presents with a chief complaint of     HPI: 63F with recent laparoscopic sleeve gastrectomy (7/2018) at [facility], presents with inability to tolerate PO diet. She states that since the procedure she has been unable to tolerate solid food. She has occasionally kept down pureed diet, but for the majority of the time has been taking clear to full liquids only. Over the past few days she has had worsening nausea and vomiting. She also notes generalized abdominal discomfort. She denies melena, hematochezia, hematemesis.     Allergies:  tetracycline (Hives)      Home Medications:  Aspirin Enteric Coated 81 mg oral delayed release tablet: 1 tab(s) orally once a day (12 Sep 2018 18:10)  atorvastatin 40 mg oral tablet: 1 tab(s) orally once a day (at bedtime) (12 Sep 2018 18:11)  CeleXA 20 mg oral tablet: 1 tab(s) orally once a day (12 Sep 2018 18:07)  Colace 100 mg oral capsule: 1 cap(s) orally 2 times a day, As Needed (12 Sep 2018 18:11)  enalapril 20 mg oral tablet: 1 tab(s) orally 2 times a day (12 Sep 2018 18:10)  folic acid 1 mg oral tablet: 1 tab(s) orally once a day (12 Sep 2018 18:12)  hydrOXYzine hydrochloride 25 mg oral tablet: 1 tab(s) orally 4 times a day, As Needed (12 Sep 2018 18:09)  magnesium oxide: 400 milligram(s) orally 2 times a day (12 Sep 2018 18:07)  Metoprolol Tartrate 50 mg oral tablet: 1 tab(s) orally 2 times a day (12 Sep 2018 18:09)  risperiDONE 1 mg oral tablet: 1 tab(s) orally 2 times a day (12 Sep 2018 18:09)  Symbicort 160 mcg-4.5 mcg/inh inhalation aerosol: 2 puff(s) inhaled 2 times a day (12 Sep 2018 18:09)  Ventolin HFA 90 mcg/inh inhalation aerosol: 2 puff(s) inhaled 4 times a day, As Needed (12 Sep 2018 18:10)  Vitamin B12 50 mcg oral tablet: 1 tab(s) orally once a day (12 Sep 2018 18:12)  Vitamin D3 50,000 intl units oral capsule: 1 cap(s) orally once a week (12 Sep 2018 18:11)      Hospital Medications:  heparin  Injectable 5000 Unit(s) SubCutaneous every 8 hours  lactated ringers. 1000 milliLiter(s) IV Continuous <Continuous>  multivitamin/thiamine/folic acid in sodium chloride 0.9% 1000 milliLiter(s) IV Continuous <Continuous>  potassium chloride  10 mEq/100 mL IVPB 10 milliEquivalent(s) IV Intermittent every 1 hour  sucralfate suspension 1 Gram(s) Oral two times a day      PMHX/PSHX:  History of sleeve gastrectomy      Family history:  As above    Social History: As above    Review of systems: Negative, except as otherwise noted above      PHYSICAL EXAM:   Vital Signs:  Vital Signs Last 24 Hrs  T(C): 36.9 (12 Sep 2018 12:34), Max: 36.9 (12 Sep 2018 12:34)  T(F): 98.5 (12 Sep 2018 12:34), Max: 98.5 (12 Sep 2018 12:34)  HR: 75 (12 Sep 2018 12:34) (60 - 75)  BP: 133/68 (12 Sep 2018 12:34) (91/56 - 133/68)  BP(mean): --  RR: 18 (12 Sep 2018 12:34) (18 - 18)  SpO2: 96% (12 Sep 2018 12:34) (96% - 98%)  Daily     Daily     GENERAL:  No acute distress  HEENT:  Anicteric, no thrush  CHEST:  Non-labored breathing, lungs clear b/l  HEART:  +s1, s2 heart sounds, no murmurs  ABDOMEN:  Soft, mildly tender diffusely, no rebound, no guarding  EXTREMITIES:  warm and well perfused, no edema  SKIN:  No rash  NEURO:  Awake, interactive        LABS:  CBC Full  -  ( 12 Sep 2018 09:18 )  WBC Count : 5.4 K/uL  Hemoglobin : 14.0 g/dL  Hematocrit : 41.9 %  Platelet Count - Automated : 117 K/uL  Mean Cell Volume : 98.9 fl  Auto Neutrophil # : 2.8 K/uL  Auto Neutrophil % : 50.7 %    09-12 @ 09:18  Na 153 mmol/L  K 2.9 mmol/L  Cl 112 mmol/L  CO2 25 mmol/L  BUN 28 mg/dL  Creat 1.72 mg/dL  Glucose 83 mg/dL  Ca 9.0 mg/dL    Total protein 7.3 g/dL  Albumin 3.4 g/dL  T bili 1.3 mg/dL  Alk phos 39 U/L  AST 43 U/L  ALT 18 U/L    PT/INR - ( 12 Sep 2018 09:18 )   PT: 10.8 sec;   INR: 0.99 ratio         PTT - ( 12 Sep 2018 09:18 )  PTT:24.2 sec      Imaging:

## 2018-09-12 NOTE — ED PROVIDER NOTE - MEDICAL DECISION MAKING DETAILS
s/p sleeve gastrectomy w/ chronic failure to thrive, CT at [facility] w/ no obvious complications of sleeve but pt w/ SUSIE and evidence of dehydration. IV hydration and vitamin supplementation. Bariatrics consult

## 2018-09-12 NOTE — DISCHARGE NOTE ADULT - MEDICATION SUMMARY - MEDICATIONS TO TAKE
I will START or STAY ON the medications listed below when I get home from the hospital:    Aspirin Enteric Coated 81 mg oral delayed release tablet  -- 1 tab(s) by mouth once a day  -- Indication: For Prophylactic measure    enalapril 20 mg oral tablet  -- 1 tab(s) by mouth 2 times a day  -- Indication: For Essential hypertension    CeleXA 20 mg oral tablet  -- 1 tab(s) by mouth once a day  -- Indication: For Depression    atorvastatin 40 mg oral tablet  -- 1 tab(s) by mouth once a day (at bedtime)  -- Indication: For Cholesterol    risperiDONE 1 mg oral tablet  -- 1 tab(s) by mouth 2 times a day  -- Indication: For Prophylactic measure    hydrOXYzine hydrochloride 25 mg oral tablet  -- 1 tab(s) by mouth 4 times a day, As Needed  -- Indication: For Prophylactic measure    Metoprolol Tartrate 50 mg oral tablet  -- 1 tab(s) by mouth 2 times a day  -- Indication: For Essential hypertension    Symbicort 160 mcg-4.5 mcg/inh inhalation aerosol  -- 2 puff(s) inhaled 2 times a day  -- Indication: For breathing treatment    Ventolin HFA 90 mcg/inh inhalation aerosol  -- 2 puff(s) inhaled 4 times a day, As Needed  -- Indication: For shortness of breath as needed    Colace 100 mg oral capsule  -- 1 cap(s) by mouth 2 times a day, As Needed  -- Indication: For constipation    magnesium oxide  -- 400 milligram(s) by mouth 2 times a day  -- Indication: For supplement    Vitamin D3 50,000 intl units oral capsule  -- 1 cap(s) by mouth once a week  -- Indication: For Vitamin supplement    folic acid 1 mg oral tablet  -- 1 tab(s) by mouth once a day  -- Indication: For Vitamin supplement    Vitamin B12 50 mcg oral tablet  -- 1 tab(s) by mouth once a day  -- Indication: For Vitamin supplement

## 2018-09-12 NOTE — DISCHARGE NOTE ADULT - HOSPITAL COURSE
63F h/o HTN, CAD (no intervention) p/w nausea, vomiting, and decreased po intake since gastric sleeve surgery 7/2018. Pt admitted for vomiting, SUSIE, hypernatremia, hypokalemia, and reported hypotension at outside hospital.   Pt seen by surgery and GI, pending possible endoscopy for possible gastric stenting. Pt in ED admitted, awaiting bed. Called by RN that pt pulled out her IV earlier and wishes to leave. Pt stated to RN did not wish for another endoscopy, stating she had prior endoscopy at [facility]. Pt left hospital prior to discussing risks of leaving AMA. Pt was A&Ox3 and ambulating well independently with cane.

## 2018-09-12 NOTE — H&P ADULT - NSHPLABSRESULTS_GEN_ALL_CORE
Patient is a 63y old  Female who presents with a chief complaint of       LABS:                        14.0   5.4   )-----------( 117      ( 12 Sep 2018 09:18 )             41.9     09-12    153<H>  |  112<H>  |  28<H>  ----------------------------<  83  2.9<LL>   |  25  |  1.72<H>    Ca    9.0      12 Sep 2018 09:18  Mg     2.1     09-12    TPro  7.3  /  Alb  3.4  /  TBili  1.3<H>  /  DBili  x   /  AST  43<H>  /  ALT  18  /  AlkPhos  39<L>  09-12    PT/INR - ( 12 Sep 2018 09:18 )   PT: 10.8 sec;   INR: 0.99 ratio         PTT - ( 12 Sep 2018 09:18 )  PTT:24.2 sec          RADIOLOGY & ADDITIONAL TESTS:    Imaging Personally Reviewed: CT a/p reviewed - no obstruction  Consultant(s) Notes Reviewed:    Care Discussed with Consultants/Other Providers: Patient is a 63y old  Female who presents with a chief complaint of       LABS:                        14.0   5.4   )-----------( 117      ( 12 Sep 2018 09:18 )             41.9     09-12    153<H>  |  112<H>  |  28<H>  ----------------------------<  83  2.9<LL>   |  25  |  1.72<H>    Ca    9.0      12 Sep 2018 09:18  Mg     2.1     09-12    TPro  7.3  /  Alb  3.4  /  TBili  1.3<H>  /  DBili  x   /  AST  43<H>  /  ALT  18  /  AlkPhos  39<L>  09-12    PT/INR - ( 12 Sep 2018 09:18 )   PT: 10.8 sec;   INR: 0.99 ratio         PTT - ( 12 Sep 2018 09:18 )  PTT:24.2 sec          RADIOLOGY & ADDITIONAL TESTS:    Imaging Personally Reviewed: CT a/p reviewed - no obstruction  Consultant(s) Notes Reviewed: GI   Care Discussed with Consultants/Other Providers:

## 2018-09-12 NOTE — H&P ADULT - FAMILY HISTORY
Grandparent  Still living? Unknown  Family history of diabetes mellitus in paternal grandfather, Age at diagnosis: Age Unknown

## 2018-09-12 NOTE — ED ADULT NURSE NOTE - NSIMPLEMENTINTERV_GEN_ALL_ED
Implemented All Universal Safety Interventions:  [place] to call system. Call bell, personal items and telephone within reach. Instruct patient to call for assistance. Room bathroom lighting operational. Non-slip footwear when patient is off stretcher. Physically safe environment: no spills, clutter or unnecessary equipment. Stretcher in lowest position, wheels locked, appropriate side rails in place.

## 2018-09-12 NOTE — DISCHARGE NOTE ADULT - CARE PROVIDER_API CALL
Souleymane Brown), Gastroenterology; Internal Medicine  [address] [place] [address]  Phone: [phone]  Fax: [phone]    Roney Wisdom), Surgery  Dept Director  [address] [place] [address]  Phone: [phone]  Fax: [phone]

## 2018-09-12 NOTE — ED PROVIDER NOTE - ATTENDING CONTRIBUTION TO CARE
64 yo F s/p gastric sleeve in July 2018 at [facility] has had intractable nausea and [facility]. Patient also with SUSIE, hypoK c/w dehyderation. Transferred to [facility] for medicine admission and bariatrics consult. Pt currently denies n/pain currently. No cp ,sob, fever, chills,     GEN: no acute respiratory distress. nontoxic, speaking comfortably in full sentences, ambulating with steady gait.  HEENT: NCAT. face symmetrical. PERRL 4mm, EOMI, dry mm. oropharynx wnl.  Neck: no JVD, trachea midline, no LAD  CV: RRR. +S1S2, no murmur.   Chest: CTA B/l. no wheezing, rales, rhonchi. no retractions. good air movement.   ABD: +BS, soft, non distended, non tender. No guarding/rebound.   MSK: No clubbing, cyanosis, edema. FROM of all extremities. No midline or paraspinal tenderness.   Neuro: AOOX3.      64 yo dehydration, aka. recent gastric sleeve. accepted to medicine for further management of electrolyte abnormalities. emr labs reviewed. pt declining addition lab work at this time. bariatric consulted.

## 2018-09-12 NOTE — H&P ADULT - PROBLEM SELECTOR PLAN 1
unclear etiology -no obstruction on ct a/p   ?related to gastric sleeve vs gastritis   Bariatric surgery consult pending - ?need for EGD unclear etiology -no obstruction on ct a/p   ?related to gastric sleeve vs gastritis   full Bariatric surgery consult pending - ?need for EGD  heartburn - ppi daily, carafate (has improved her pain)

## 2018-09-12 NOTE — DISCHARGE NOTE ADULT - PATIENT PORTAL LINK FT
You can access the [employer] [place] Patient Portal, offered by [facility], by registering with the following website: [email or web address]

## 2018-09-12 NOTE — H&P ADULT - ASSESSMENT
63F h/o HTN, CAD (no intervention) p/w nausea, vomiting, and decreased po intake since gastric sleeve surgery 7/2018.

## 2018-09-12 NOTE — ED ADULT NURSE NOTE - CAS TRG GENERAL AIRWAY, MLM
Detail Level: Detailed Mode: MED Consent: Written consent obtained, risks reviewed including but not limited to crusting, scabbing, blistering, scarring, darker or lighter pigmentary change, incidental hair removal, bruising, and/or incomplete removal. Fluence #1 (J/Cm2): 4061 Treatment Number: 45 Location #2: Bilateral Elbows Device Serial Number (Optional): [id number] Patent Fluence #2 (J/Cm2): 9864 Total Square Area: 176 Price (Use Numbers Only, No Special Characters Or $): 20.00 Location #1: Bilateral arms/Legs Post-Care Instructions: I reviewed with the patient in detail post-care instructions. Patient should stay away from the sun and wear sun protection until treated areas are fully healed. Total Pulses (Optional): 299 Fluence Units: J/cm2

## 2018-09-12 NOTE — ED ADULT NURSE REASSESSMENT NOTE - NS ED NURSE REASSESS COMMENT FT1
Patient refusing to have blood drawn. Patient states "I've had enough done to me today, I don't want nothing done." Labs not drawn. MD Robison aware.
pharmacy called for multivitamin fluid order.
Pt is in no current distress. Comfort and safety provided. Will continue to monitor. Awaiting bed assignment.

## 2018-09-14 PROBLEM — Z00.00 ENCOUNTER FOR PREVENTIVE HEALTH EXAMINATION: Noted: 2018-09-14

## 2018-09-14 PROCEDURE — 85027 COMPLETE CBC AUTOMATED: CPT

## 2018-09-14 PROCEDURE — 99285 EMERGENCY DEPT VISIT HI MDM: CPT

## 2018-09-14 PROCEDURE — 80053 COMPREHEN METABOLIC PANEL: CPT

## 2018-09-14 PROCEDURE — 86850 RBC ANTIBODY SCREEN: CPT

## 2018-09-14 PROCEDURE — 85610 PROTHROMBIN TIME: CPT

## 2018-09-14 PROCEDURE — 86900 BLOOD TYPING SEROLOGIC ABO: CPT

## 2018-09-14 PROCEDURE — 86901 BLOOD TYPING SEROLOGIC RH(D): CPT

## 2018-09-14 PROCEDURE — 85730 THROMBOPLASTIN TIME PARTIAL: CPT

## 2018-09-14 PROCEDURE — 83735 ASSAY OF MAGNESIUM: CPT

## 2018-09-25 ENCOUNTER — APPOINTMENT (OUTPATIENT)
Dept: CARDIOLOGY | Facility: CLINIC | Age: 63
End: 2018-09-25

## 2018-10-22 ENCOUNTER — EMERGENCY (EMERGENCY)
Facility: HOSPITAL | Age: 63
LOS: 1 days | Discharge: HOME | End: 2018-10-22
Attending: EMERGENCY MEDICINE | Admitting: EMERGENCY MEDICINE
Payer: MEDICAID

## 2018-10-22 VITALS
HEART RATE: 67 BPM | TEMPERATURE: 98 F | DIASTOLIC BLOOD PRESSURE: 81 MMHG | SYSTOLIC BLOOD PRESSURE: 120 MMHG | OXYGEN SATURATION: 99 % | RESPIRATION RATE: 20 BRPM

## 2018-10-22 VITALS
RESPIRATION RATE: 18 BRPM | DIASTOLIC BLOOD PRESSURE: 67 MMHG | SYSTOLIC BLOOD PRESSURE: 114 MMHG | OXYGEN SATURATION: 98 % | HEART RATE: 98 BPM | TEMPERATURE: 98 F

## 2018-10-22 DIAGNOSIS — Z90.49 ACQUIRED ABSENCE OF OTHER SPECIFIED PARTS OF DIGESTIVE TRACT: Chronic | ICD-10-CM

## 2018-10-22 DIAGNOSIS — Z90.3 ACQUIRED ABSENCE OF STOMACH [PART OF]: Chronic | ICD-10-CM

## 2018-10-22 DIAGNOSIS — Z98.890 OTHER SPECIFIED POSTPROCEDURAL STATES: Chronic | ICD-10-CM

## 2018-10-22 DIAGNOSIS — Z98.84 BARIATRIC SURGERY STATUS: Chronic | ICD-10-CM

## 2018-10-22 PROBLEM — K21.9 GASTRO-ESOPHAGEAL REFLUX DISEASE WITHOUT ESOPHAGITIS: Chronic | Status: ACTIVE | Noted: 2018-09-11

## 2018-10-22 PROBLEM — I10 ESSENTIAL (PRIMARY) HYPERTENSION: Chronic | Status: ACTIVE | Noted: 2018-09-12

## 2018-10-22 PROBLEM — E66.9 OBESITY, UNSPECIFIED: Chronic | Status: ACTIVE | Noted: 2018-09-12

## 2018-10-22 LAB
ALBUMIN SERPL ELPH-MCNC: 3.2 G/DL — LOW (ref 3.3–5)
ALP SERPL-CCNC: 73 U/L — SIGNIFICANT CHANGE UP (ref 40–120)
ALT FLD-CCNC: 11 U/L — SIGNIFICANT CHANGE UP (ref 4–33)
APPEARANCE UR: SIGNIFICANT CHANGE UP
AST SERPL-CCNC: 34 U/L — HIGH (ref 4–32)
BACTERIA # UR AUTO: NEGATIVE — SIGNIFICANT CHANGE UP
BASE EXCESS BLDV CALC-SCNC: -2.5 MMOL/L — SIGNIFICANT CHANGE UP
BASOPHILS # BLD AUTO: 0.05 K/UL — SIGNIFICANT CHANGE UP (ref 0–0.2)
BASOPHILS NFR BLD AUTO: 0.7 % — SIGNIFICANT CHANGE UP (ref 0–2)
BILIRUB SERPL-MCNC: 0.9 MG/DL — SIGNIFICANT CHANGE UP (ref 0.2–1.2)
BILIRUB UR-MCNC: SIGNIFICANT CHANGE UP
BLOOD GAS VENOUS - CREATININE: 0.88 MG/DL — SIGNIFICANT CHANGE UP (ref 0.5–1.3)
BLOOD UR QL VISUAL: NEGATIVE — SIGNIFICANT CHANGE UP
BUN SERPL-MCNC: 10 MG/DL — SIGNIFICANT CHANGE UP (ref 7–23)
CALCIUM SERPL-MCNC: 9.4 MG/DL — SIGNIFICANT CHANGE UP (ref 8.4–10.5)
CHLORIDE BLDV-SCNC: 104 MMOL/L — SIGNIFICANT CHANGE UP (ref 96–108)
CHLORIDE SERPL-SCNC: 100 MMOL/L — SIGNIFICANT CHANGE UP (ref 98–107)
CO2 SERPL-SCNC: 20 MMOL/L — LOW (ref 22–31)
COLOR SPEC: YELLOW — SIGNIFICANT CHANGE UP
CREAT SERPL-MCNC: 0.96 MG/DL — SIGNIFICANT CHANGE UP (ref 0.5–1.3)
EOSINOPHIL # BLD AUTO: 0.04 K/UL — SIGNIFICANT CHANGE UP (ref 0–0.5)
EOSINOPHIL NFR BLD AUTO: 0.6 % — SIGNIFICANT CHANGE UP (ref 0–6)
GAS PNL BLDV: 138 MMOL/L — SIGNIFICANT CHANGE UP (ref 136–146)
GLUCOSE BLDV-MCNC: 66 — LOW (ref 70–99)
GLUCOSE SERPL-MCNC: 69 MG/DL — LOW (ref 70–99)
GLUCOSE UR-MCNC: NEGATIVE — SIGNIFICANT CHANGE UP
HCO3 BLDV-SCNC: 21 MMOL/L — SIGNIFICANT CHANGE UP (ref 20–27)
HCT VFR BLD CALC: 41 % — SIGNIFICANT CHANGE UP (ref 34.5–45)
HCT VFR BLDV CALC: 38.2 % — SIGNIFICANT CHANGE UP (ref 34.5–45)
HGB BLD-MCNC: 13.8 G/DL — SIGNIFICANT CHANGE UP (ref 11.5–15.5)
HGB BLDV-MCNC: 12.4 G/DL — SIGNIFICANT CHANGE UP (ref 11.5–15.5)
HYALINE CASTS # UR AUTO: SIGNIFICANT CHANGE UP
IMM GRANULOCYTES # BLD AUTO: 0.01 # — SIGNIFICANT CHANGE UP
IMM GRANULOCYTES NFR BLD AUTO: 0.1 % — SIGNIFICANT CHANGE UP (ref 0–1.5)
KETONES UR-MCNC: HIGH
LACTATE BLDV-MCNC: 1.6 MMOL/L — SIGNIFICANT CHANGE UP (ref 0.5–2)
LEUKOCYTE ESTERASE UR-ACNC: SIGNIFICANT CHANGE UP
LYMPHOCYTES # BLD AUTO: 3.28 K/UL — SIGNIFICANT CHANGE UP (ref 1–3.3)
LYMPHOCYTES # BLD AUTO: 48.9 % — HIGH (ref 13–44)
MCHC RBC-ENTMCNC: 32.8 PG — SIGNIFICANT CHANGE UP (ref 27–34)
MCHC RBC-ENTMCNC: 33.7 % — SIGNIFICANT CHANGE UP (ref 32–36)
MCV RBC AUTO: 97.4 FL — SIGNIFICANT CHANGE UP (ref 80–100)
MONOCYTES # BLD AUTO: 0.64 K/UL — SIGNIFICANT CHANGE UP (ref 0–0.9)
MONOCYTES NFR BLD AUTO: 9.5 % — SIGNIFICANT CHANGE UP (ref 2–14)
MUCOUS THREADS # UR AUTO: SIGNIFICANT CHANGE UP
NEUTROPHILS # BLD AUTO: 2.69 K/UL — SIGNIFICANT CHANGE UP (ref 1.8–7.4)
NEUTROPHILS NFR BLD AUTO: 40.2 % — LOW (ref 43–77)
NITRITE UR-MCNC: NEGATIVE — SIGNIFICANT CHANGE UP
NRBC # FLD: 0 — SIGNIFICANT CHANGE UP
PCO2 BLDV: 47 MMHG — SIGNIFICANT CHANGE UP (ref 41–51)
PH BLDV: 7.31 PH — LOW (ref 7.32–7.43)
PH UR: 5.5 — SIGNIFICANT CHANGE UP (ref 5–8)
PLATELET # BLD AUTO: 199 K/UL — SIGNIFICANT CHANGE UP (ref 150–400)
PMV BLD: 12.7 FL — SIGNIFICANT CHANGE UP (ref 7–13)
PO2 BLDV: 30 MMHG — LOW (ref 35–40)
POTASSIUM BLDV-SCNC: 4.4 MMOL/L — SIGNIFICANT CHANGE UP (ref 3.4–4.5)
POTASSIUM SERPL-MCNC: 4.1 MMOL/L — SIGNIFICANT CHANGE UP (ref 3.5–5.3)
POTASSIUM SERPL-SCNC: 4.1 MMOL/L — SIGNIFICANT CHANGE UP (ref 3.5–5.3)
PROT SERPL-MCNC: 7.6 G/DL — SIGNIFICANT CHANGE UP (ref 6–8.3)
PROT UR-MCNC: 200 — HIGH
RBC # BLD: 4.21 M/UL — SIGNIFICANT CHANGE UP (ref 3.8–5.2)
RBC # FLD: 16.9 % — HIGH (ref 10.3–14.5)
RBC CASTS # UR COMP ASSIST: SIGNIFICANT CHANGE UP (ref 0–?)
SAO2 % BLDV: 47 % — LOW (ref 60–85)
SODIUM SERPL-SCNC: 139 MMOL/L — SIGNIFICANT CHANGE UP (ref 135–145)
SP GR SPEC: 1.03 — SIGNIFICANT CHANGE UP (ref 1–1.04)
SQUAMOUS # UR AUTO: SIGNIFICANT CHANGE UP
UROBILINOGEN FLD QL: HIGH
WBC # BLD: 6.71 K/UL — SIGNIFICANT CHANGE UP (ref 3.8–10.5)
WBC # FLD AUTO: 6.71 K/UL — SIGNIFICANT CHANGE UP (ref 3.8–10.5)
WBC UR QL: SIGNIFICANT CHANGE UP (ref 0–?)

## 2018-10-22 PROCEDURE — 74177 CT ABD & PELVIS W/CONTRAST: CPT | Mod: 26

## 2018-10-22 PROCEDURE — 99285 EMERGENCY DEPT VISIT HI MDM: CPT

## 2018-10-22 RX ORDER — ONDANSETRON 8 MG/1
4 TABLET, FILM COATED ORAL ONCE
Qty: 0 | Refills: 0 | Status: COMPLETED | OUTPATIENT
Start: 2018-10-22 | End: 2018-10-22

## 2018-10-22 RX ORDER — MORPHINE SULFATE 50 MG/1
4 CAPSULE, EXTENDED RELEASE ORAL ONCE
Qty: 0 | Refills: 0 | Status: DISCONTINUED | OUTPATIENT
Start: 2018-10-22 | End: 2018-10-22

## 2018-10-22 RX ORDER — DOCUSATE SODIUM 100 MG
100 CAPSULE ORAL DAILY
Qty: 0 | Refills: 0 | Status: DISCONTINUED | OUTPATIENT
Start: 2018-10-22 | End: 2018-10-26

## 2018-10-22 RX ORDER — POLYETHYLENE GLYCOL 3350 17 G/17G
17 POWDER, FOR SOLUTION ORAL ONCE
Qty: 0 | Refills: 0 | Status: COMPLETED | OUTPATIENT
Start: 2018-10-22 | End: 2018-10-22

## 2018-10-22 RX ORDER — MINERAL OIL
133 OIL (ML) MISCELLANEOUS ONCE
Qty: 0 | Refills: 0 | Status: COMPLETED | OUTPATIENT
Start: 2018-10-22 | End: 2018-10-22

## 2018-10-22 RX ADMIN — Medication 1 ENEMA: at 20:21

## 2018-10-22 RX ADMIN — MORPHINE SULFATE 4 MILLIGRAM(S): 50 CAPSULE, EXTENDED RELEASE ORAL at 14:18

## 2018-10-22 RX ADMIN — Medication 133 MILLILITER(S): at 14:42

## 2018-10-22 RX ADMIN — ONDANSETRON 4 MILLIGRAM(S): 8 TABLET, FILM COATED ORAL at 13:27

## 2018-10-22 RX ADMIN — Medication 100 MILLIGRAM(S): at 19:52

## 2018-10-22 RX ADMIN — POLYETHYLENE GLYCOL 3350 17 GRAM(S): 17 POWDER, FOR SOLUTION ORAL at 19:52

## 2018-10-22 RX ADMIN — MORPHINE SULFATE 4 MILLIGRAM(S): 50 CAPSULE, EXTENDED RELEASE ORAL at 13:27

## 2018-10-22 NOTE — ED PROVIDER NOTE - NS ED ROS FT
General: denies fever, chills, fatigue  Neck: denies neck pain, swelling, stiffness  CV: denies chest pain, palpitations  Resp: denies difficulty breathing, cough  GI: HPI  Urinary: denies pain on urination change  MSK: denies joint and muscle pain  Neuro: denies headaches, lightheadedness  Skin: denies rashes

## 2018-10-22 NOTE — ED PROVIDER NOTE - CARE PLAN
Principal Discharge DX:	Constipation, unspecified constipation type  Assessment and plan of treatment:	Thank you for visiting our Emergency Department, it has been a pleasure taking part in your healthcare.    Your discharge diagnosis is: Constipation     A copy of resulted labs, imaging, and findings have been provided to you.   You have had a detailed discussion with your provider regarding your diagnosis, management and discharge plan including, but not limited to: return precautions, follow up visits with existing or new providers, new prescriptions and/or medication changes, wound and/or spint/cast care or other care   aspects specific to your diagnosis and treatment. You have been given the opportunity to have your questions answered. At this time you have been deemed stable and fit for discharge.    Return precautions to the Emergency Department include but are not limited to: unrelenting nausea, vomiting, fever, chills, chest pain, shortness of breath, dizziness, chest or abdominal pain, worsening back pain, syncope, blood in urine or stool, headache that doesn't resolve, numbness or tingling, loss of sensation, loss of motor function, or any other concerning symptoms.

## 2018-10-22 NOTE — ED ADULT NURSE NOTE - OBJECTIVE STATEMENT
pt received to intake #9 with c/o constipation and nausea x 3 weeks. + flatus. states since having gastric surgery she is only able to take down liquids, 1tsp at a time. IV placed, labs drawn and sent. medication given as per MDs orders. pt to go to  for CT scan.

## 2018-10-22 NOTE — ED PROVIDER NOTE - MEDICAL DECISION MAKING DETAILS
63F hx of multiple abd surgery, recent gastric sleeve, c/o constipation and no BM for 2wk. concerning for SBO, adhesion, ischemic bowl, will get labs and disimpact and CT abdomen w. oral contrast.

## 2018-10-22 NOTE — ED PROVIDER NOTE - ABDOMINAL EXAM
tender.../soft/obese therefore unclear if distended, diffusely tender no guard no rebound, bowel sourds x 4

## 2018-10-22 NOTE — ED PROVIDER NOTE - PSH
H/O bariatric surgery    History of sleeve gastrectomy    S/P cholecystectomy    S/P myomectomy    S/P ventral herniorrhaphy

## 2018-10-22 NOTE — ED PROVIDER NOTE - OBJECTIVE STATEMENT
12:51 Minda att: 63F s/p 7/2/2018 bariatric sleeve ([facility]) c/o constipation x 2 wks. July bariatic surgery. Past 2 wks increasing constipation, diffuse abd pain, last bm 2 wks ago. Pos nausea. Denies h/o sbo, denies painkillers. PMH PSH MED ALL SMOKE PCP PHARMACY 12:51 Minda att: 63F s/p 7/2/2018 [facility] c/o constipation x 2 wks. July bariatic surgery. Past 2 wks increasing constipation, diffuse abd pain, last bm 2 wks ago. Since then self disimpacts. Pos nausea. Denies h/o sbo, denies painkillers. Extensive h/o abdominal surgeries.

## 2018-10-22 NOTE — ED PROVIDER NOTE - PLAN OF CARE
Thank you for visiting our Emergency Department, it has been a pleasure taking part in your healthcare.    Your discharge diagnosis is: Constipation     A copy of resulted labs, imaging, and findings have been provided to you.   You have had a detailed discussion with your provider regarding your diagnosis, management and discharge plan including, but not limited to: return precautions, follow up visits with existing or new providers, new prescriptions and/or medication changes, wound and/or spint/cast care or other care   aspects specific to your diagnosis and treatment. You have been given the opportunity to have your questions answered. At this time you have been deemed stable and fit for discharge.    Return precautions to the Emergency Department include but are not limited to: unrelenting nausea, vomiting, fever, chills, chest pain, shortness of breath, dizziness, chest or abdominal pain, worsening back pain, syncope, blood in urine or stool, headache that doesn't resolve, numbness or tingling, loss of sensation, loss of motor function, or any other concerning symptoms.

## 2018-10-22 NOTE — ED ADULT NURSE NOTE - NSIMPLEMENTINTERV_GEN_ALL_ED
Implemented All Universal Safety Interventions:  [place] to call system. Call bell, personal items and telephone within reach. Instruct patient to call for assistance. Room bathroom lighting operational. Non-slip footwear when patient is off stretcher. Physically safe environment: no spills, clutter or unnecessary equipment. Stretcher in lowest position, wheels locked, appropriate side rails in place.

## 2018-10-22 NOTE — ED PROVIDER NOTE - PROGRESS NOTE DETAILS
Minda att: Patient declines po gastroview, explains she won't drink anything sugary. Explained to patient po gastroview will allow radiologist and surgeons to better visualize if there is obstruction. Patient upset, "you're not listening to me." Strongly recommended patient take po gastroview but ultimately up to patient if she takes oral contrast. Minda att: Patient reports pain gone with morphine, nausea resolved with zofran. Rectal exam by Dr. Gore and chaperoned by me. Digit did not enter rectum, patient reporting significant pain, EMELY terminated. Enema ordered instead. patient is stable and vss, reports no pain. CT shows dilated CBD to 11mm and patient doesn't complaint of active pain right now and I have discuss the potential d/c plan with the patient. Patient is willing to followup with the GI doc at outpatient basis. Will give patient maalox and colace for d/c.

## 2018-10-22 NOTE — ED PROVIDER NOTE - ATTENDING CONTRIBUTION TO CARE
Dr. Perla: I have personally seen and examined this patient at the bedside. I have fully participated in the care of this patient. I have reviewed all pertinent clinical information, including history, physical exam, plan and the Resident's note and agree except as noted. HPI above as by me. PE above as by me. DDX  sbo versus constipation PLAN ct abd pelvis po and iv, pain control, nausea control, depending on ct finding gen surg c/s

## 2018-10-22 NOTE — ED PROVIDER NOTE - PHYSICAL EXAMINATION
General: NAD, overly obese  HENT: Atraumatic  Cardiovascular: RRR, S1&2, no murmurs, rubs, radial pulses equal and b/l  Respiratory: CTABL, no wheezes or crackles, no decreased breath sounds  Abdominal:  soft and non-tender, midline and RUQ scar that is healed, mild tenderness to palpation, unable to ascertain due to weight and pannus   Extremities: no edema of the legs/feet  Skin: warm, well perfused  Neurologic: nonfocal, AAOx3  Psych: normal mood and affect

## 2018-10-23 LAB
BACTERIA UR CULT: SIGNIFICANT CHANGE UP
SPECIMEN SOURCE: SIGNIFICANT CHANGE UP

## 2018-10-23 RX ORDER — POLYETHYLENE GLYCOL 3350 17 G/17G
17 POWDER, FOR SOLUTION ORAL
Qty: 1 | Refills: 0
Start: 2018-10-23 | End: 2018-10-29

## 2018-10-23 RX ORDER — DOCUSATE SODIUM 100 MG
1 CAPSULE ORAL
Qty: 14 | Refills: 0
Start: 2018-10-23 | End: 2018-10-29

## 2020-06-15 NOTE — ED ADULT NURSE NOTE - NS ED NURSE DISCH DISPOSITION
Transferred S Plasty Text: Given the location and shape of the defect, and the orientation of relaxed skin tension lines, an S-plasty was deemed most appropriate for repair.  Using a sterile surgical marker, the appropriate outline of the S-plasty was drawn, incorporating the defect and placing the expected incisions within the relaxed skin tension lines where possible.  The area thus outlined was incised deep to adipose tissue with a #15 scalpel blade.  The skin margins were undermined to an appropriate distance in all directions utilizing iris scissors. The skin flaps were advanced over the defect.  The opposing margins were then approximated with interrupted buried subcutaneous sutures.

## 2021-02-16 NOTE — ED PROVIDER NOTE - RESPIRATORY NEGATIVE STATEMENT, MLM
Pharmacy requesting refill for simvastatin.    LOV 3/3/20    Last refill 6/2/20 #90 tabs with 1 refill    Last lab 12/3/19    Reception please call pt to schedule an appt, is overdue since 4/3/20.  Please route back once the appt is made, thank you     no chest pain, no cough, and no shortness of breath.

## 2022-02-28 NOTE — ED ADULT NURSE NOTE - PRIMARY CARE PROVIDER
Please follow up with your pediatrician  If you do not have one, I have provided you with a referral  Use medication as prescribed  Please return to the ER with any worsening symptoms 
Jacey

## 2023-02-28 ENCOUNTER — APPOINTMENT (OUTPATIENT)
Dept: CARDIOLOGY | Facility: CLINIC | Age: 68
End: 2023-02-28
Payer: MEDICARE

## 2023-02-28 ENCOUNTER — NON-APPOINTMENT (OUTPATIENT)
Age: 68
End: 2023-02-28

## 2023-02-28 VITALS
DIASTOLIC BLOOD PRESSURE: 80 MMHG | HEIGHT: 61 IN | SYSTOLIC BLOOD PRESSURE: 124 MMHG | HEART RATE: 85 BPM | BODY MASS INDEX: 52.87 KG/M2 | WEIGHT: 280 LBS | OXYGEN SATURATION: 97 %

## 2023-02-28 DIAGNOSIS — Z86.39 PERSONAL HISTORY OF OTHER ENDOCRINE, NUTRITIONAL AND METABOLIC DISEASE: ICD-10-CM

## 2023-02-28 DIAGNOSIS — Z86.79 PERSONAL HISTORY OF OTHER DISEASES OF THE CIRCULATORY SYSTEM: ICD-10-CM

## 2023-02-28 DIAGNOSIS — Z86.16 PERSONAL HISTORY OF COVID-19: ICD-10-CM

## 2023-02-28 PROCEDURE — 93306 TTE W/DOPPLER COMPLETE: CPT

## 2023-02-28 PROCEDURE — 93000 ELECTROCARDIOGRAM COMPLETE: CPT

## 2023-02-28 PROCEDURE — 99204 OFFICE O/P NEW MOD 45 MIN: CPT | Mod: 25

## 2023-02-28 RX ORDER — ESOMEPRAZOLE MAGNESIUM 20 MG/1
20 CAPSULE, DELAYED RELEASE ORAL DAILY
Refills: 0 | Status: DISCONTINUED | COMMUNITY
End: 2023-02-28

## 2023-02-28 RX ORDER — FUROSEMIDE 20 MG/1
20 TABLET ORAL DAILY
Refills: 0 | Status: ACTIVE | COMMUNITY

## 2023-02-28 RX ORDER — PANTOPRAZOLE 40 MG/1
40 TABLET, DELAYED RELEASE ORAL DAILY
Refills: 0 | Status: ACTIVE | COMMUNITY

## 2023-02-28 RX ORDER — DULOXETINE HYDROCHLORIDE 30 MG/1
30 CAPSULE, DELAYED RELEASE PELLETS ORAL TWICE DAILY
Refills: 0 | Status: ACTIVE | COMMUNITY

## 2023-02-28 RX ORDER — LAMOTRIGINE 25 MG/1
25 TABLET ORAL TWICE DAILY
Refills: 0 | Status: ACTIVE | COMMUNITY

## 2023-02-28 RX ORDER — PREGABALIN 100 MG/1
100 CAPSULE ORAL 3 TIMES DAILY
Refills: 0 | Status: DISCONTINUED | COMMUNITY
End: 2023-02-28

## 2023-02-28 RX ORDER — TRAZODONE HYDROCHLORIDE 100 MG/1
100 TABLET ORAL
Refills: 0 | Status: ACTIVE | COMMUNITY

## 2023-02-28 RX ORDER — ERGOCALCIFEROL 1.25 MG/1
1.25 MG CAPSULE, LIQUID FILLED ORAL
Refills: 0 | Status: ACTIVE | COMMUNITY

## 2023-02-28 RX ORDER — ATORVASTATIN CALCIUM 40 MG/1
40 TABLET, FILM COATED ORAL DAILY
Refills: 0 | Status: DISCONTINUED | COMMUNITY
End: 2023-02-28

## 2023-02-28 RX ORDER — NITROGLYCERIN 0.4 MG/1
0.4 TABLET SUBLINGUAL
Refills: 0 | Status: ACTIVE | COMMUNITY

## 2023-02-28 RX ORDER — FOLIC ACID 1 MG/1
1 TABLET ORAL DAILY
Refills: 0 | Status: ACTIVE | COMMUNITY

## 2023-02-28 NOTE — DISCUSSION/SUMMARY
[FreeTextEntry1] : In a summary Melissa Rasheed is a middle aged female with chest tightness and shortness of breath on exertion, Echo done showed normal LV systolic function and wall motion. Pharmacological nuclear stress test to rule out ischemia as she can not walk on treadmill secondary to Joint pains. Further plan based on Stress test results. Mean while any recurrent chest pains take SL NTG and if no relief call 911 and go to nearest ER. Hypertension, controlled. Continue Metoprolol and Lasix. Follow up in 6 months.

## 2023-02-28 NOTE — REVIEW OF SYSTEMS
[Dyspnea on exertion] : dyspnea during exertion [Chest Discomfort] : chest discomfort [Joint Pain] : joint pain [Negative] : Respiratory [Blurry Vision] : no blurred vision [Lower Ext Edema] : no extremity edema [Palpitations] : no palpitations [Orthopnea] : no orthopnea [PND] : no PND [Abdominal Pain] : no abdominal pain [Nausea] : no nausea [Vomiting] : no vomiting [Heartburn] : no heartburn [Rash] : no rash [Itching] : no itching [Dizziness] : no dizziness [Tremor] : no tremor was seen [Numbness (Hypoesthesia)] : no numbness [Tingling (Paresthesia)] : no tingling [Confusion] : no confusion was observed [Anxiety] : no anxiety [Under Stress] : not under stress [Easy Bleeding] : no tendency for easy bleeding [Easy Bruising] : no tendency for easy bruising

## 2023-02-28 NOTE — PHYSICAL EXAM
[Normal Venous Pressure] : normal venous pressure [No Carotid Bruit] : no carotid bruit [Soft] : abdomen soft [Non Tender] : non-tender [Normal Bowel Sounds] : normal bowel sounds [Normal Gait] : normal gait [No Edema] : no edema [No Cyanosis] : no cyanosis [Normal] : alert and oriented, normal memory

## 2023-02-28 NOTE — HISTORY OF PRESENT ILLNESS
[FreeTextEntry1] : Melissa Rasheed is a 67 year old female with Hypertension and morbid obesity comes for cardiac evaluation. Had COVID infection last month and recovered. Complaining of mild chest tightness and shortness of breath on exertion which is relieved with SL NTG or rest in 15- 20 minutes of couple of months duration. Complaint to medications.

## 2023-03-22 ENCOUNTER — APPOINTMENT (OUTPATIENT)
Dept: CV DIAGNOSTICS | Facility: HOSPITAL | Age: 68
End: 2023-03-22

## 2023-03-22 ENCOUNTER — OUTPATIENT (OUTPATIENT)
Dept: OUTPATIENT SERVICES | Facility: HOSPITAL | Age: 68
LOS: 1 days | End: 2023-03-22
Payer: MEDICARE

## 2023-03-22 DIAGNOSIS — R06.02 SHORTNESS OF BREATH: ICD-10-CM

## 2023-03-22 DIAGNOSIS — Z98.84 BARIATRIC SURGERY STATUS: Chronic | ICD-10-CM

## 2023-03-22 DIAGNOSIS — Z90.3 ACQUIRED ABSENCE OF STOMACH [PART OF]: Chronic | ICD-10-CM

## 2023-03-22 PROCEDURE — 93018 CV STRESS TEST I&R ONLY: CPT | Mod: GC

## 2023-03-22 PROCEDURE — 93016 CV STRESS TEST SUPVJ ONLY: CPT | Mod: GC

## 2023-03-22 PROCEDURE — 78452 HT MUSCLE IMAGE SPECT MULT: CPT | Mod: 26,MH

## 2023-04-04 ENCOUNTER — APPOINTMENT (OUTPATIENT)
Dept: CARDIOLOGY | Facility: CLINIC | Age: 68
End: 2023-04-04

## 2023-04-12 ENCOUNTER — APPOINTMENT (OUTPATIENT)
Dept: CARDIOLOGY | Facility: CLINIC | Age: 68
End: 2023-04-12
Payer: MEDICARE

## 2023-04-12 VITALS
SYSTOLIC BLOOD PRESSURE: 162 MMHG | HEIGHT: 61 IN | DIASTOLIC BLOOD PRESSURE: 94 MMHG | HEART RATE: 69 BPM | WEIGHT: 278 LBS | OXYGEN SATURATION: 95 % | TEMPERATURE: 96.6 F | BODY MASS INDEX: 52.49 KG/M2

## 2023-04-12 DIAGNOSIS — R94.39 ABNORMAL RESULT OF OTHER CARDIOVASCULAR FUNCTION STUDY: ICD-10-CM

## 2023-04-12 PROCEDURE — 99214 OFFICE O/P EST MOD 30 MIN: CPT

## 2023-04-12 NOTE — DISCUSSION/SUMMARY
[FreeTextEntry1] : In a summary Melissa Rasheed is a middle aged female with chest tightness and shortness of breath on exertion, with abnormal Pharmacological nuclear stress test showing  ischemia . Stress test results explained in detail and about cardiac cath to rule out coronary artery blockages. Risks and benefits explained in detail. Risks include bleeding, infection at site, risk of MI, Stroke, arterial perforation, renal failure etc including death. Understood and agreed. Mean while any recurrent chest pains take SL NTG and if no relief call 911 and go to nearest ER. Continue Aspirin. Hypertension, uncontrolled. Says nervous. Continue Metoprolol and Lasix. Follow up after cardiac cath.

## 2023-04-12 NOTE — HISTORY OF PRESENT ILLNESS
[FreeTextEntry1] : Melissa Rasheed is a 67 year old female with Hypertension, abnormal nuclear stress test  and morbid obesity comes for follow up visit. Still complaining of mild chest tightness and shortness of breath on exertion which is relieved with SL NTG or rest in 15- 20 minutes of couple of months duration. Had Nuclear stress test which showed ischemia and TID. Complaint to medications.

## 2023-05-10 ENCOUNTER — OUTPATIENT (OUTPATIENT)
Dept: OUTPATIENT SERVICES | Facility: HOSPITAL | Age: 68
LOS: 1 days | Discharge: HOME | End: 2023-05-10
Payer: MEDICARE

## 2023-05-10 DIAGNOSIS — Z90.3 ACQUIRED ABSENCE OF STOMACH [PART OF]: Chronic | ICD-10-CM

## 2023-05-10 DIAGNOSIS — Z90.49 ACQUIRED ABSENCE OF OTHER SPECIFIED PARTS OF DIGESTIVE TRACT: Chronic | ICD-10-CM

## 2023-05-10 DIAGNOSIS — Z98.890 OTHER SPECIFIED POSTPROCEDURAL STATES: Chronic | ICD-10-CM

## 2023-05-10 DIAGNOSIS — Z98.84 BARIATRIC SURGERY STATUS: Chronic | ICD-10-CM

## 2023-05-10 DIAGNOSIS — R94.39 ABNORMAL RESULT OF OTHER CARDIOVASCULAR FUNCTION STUDY: ICD-10-CM

## 2023-05-10 LAB
ANION GAP SERPL CALC-SCNC: 11 MMOL/L — SIGNIFICANT CHANGE UP (ref 7–14)
BUN SERPL-MCNC: 18 MG/DL — SIGNIFICANT CHANGE UP (ref 7–23)
CALCIUM SERPL-MCNC: 9 MG/DL — SIGNIFICANT CHANGE UP (ref 8.4–10.5)
CHLORIDE SERPL-SCNC: 108 MMOL/L — HIGH (ref 98–107)
CO2 SERPL-SCNC: 21 MMOL/L — LOW (ref 22–31)
CREAT SERPL-MCNC: 1.13 MG/DL — SIGNIFICANT CHANGE UP (ref 0.5–1.3)
EGFR: 53 ML/MIN/1.73M2 — LOW
GLUCOSE SERPL-MCNC: 98 MG/DL — SIGNIFICANT CHANGE UP (ref 70–99)
HCT VFR BLD CALC: 40.3 % — SIGNIFICANT CHANGE UP (ref 34.5–45)
HGB BLD-MCNC: 13 G/DL — SIGNIFICANT CHANGE UP (ref 11.5–15.5)
MCHC RBC-ENTMCNC: 30.2 PG — SIGNIFICANT CHANGE UP (ref 27–34)
MCHC RBC-ENTMCNC: 32.3 GM/DL — SIGNIFICANT CHANGE UP (ref 32–36)
MCV RBC AUTO: 93.5 FL — SIGNIFICANT CHANGE UP (ref 80–100)
NRBC # BLD: 0 /100 WBCS — SIGNIFICANT CHANGE UP (ref 0–0)
NRBC # FLD: 0 K/UL — SIGNIFICANT CHANGE UP (ref 0–0)
PLATELET # BLD AUTO: 218 K/UL — SIGNIFICANT CHANGE UP (ref 150–400)
POTASSIUM SERPL-MCNC: 4.5 MMOL/L — SIGNIFICANT CHANGE UP (ref 3.5–5.3)
POTASSIUM SERPL-SCNC: 4.5 MMOL/L — SIGNIFICANT CHANGE UP (ref 3.5–5.3)
RBC # BLD: 4.31 M/UL — SIGNIFICANT CHANGE UP (ref 3.8–5.2)
RBC # FLD: 12.8 % — SIGNIFICANT CHANGE UP (ref 10.3–14.5)
SODIUM SERPL-SCNC: 140 MMOL/L — SIGNIFICANT CHANGE UP (ref 135–145)
WBC # BLD: 7.12 K/UL — SIGNIFICANT CHANGE UP (ref 3.8–10.5)
WBC # FLD AUTO: 7.12 K/UL — SIGNIFICANT CHANGE UP (ref 3.8–10.5)

## 2023-05-10 PROCEDURE — 93010 ELECTROCARDIOGRAM REPORT: CPT

## 2023-05-10 PROCEDURE — 93454 CORONARY ARTERY ANGIO S&I: CPT | Mod: 26

## 2023-05-10 RX ORDER — BUDESONIDE AND FORMOTEROL FUMARATE DIHYDRATE 160; 4.5 UG/1; UG/1
2 AEROSOL RESPIRATORY (INHALATION)
Qty: 0 | Refills: 0 | DISCHARGE

## 2023-05-10 RX ORDER — ASPIRIN/CALCIUM CARB/MAGNESIUM 324 MG
1 TABLET ORAL
Refills: 0 | DISCHARGE

## 2023-05-10 RX ORDER — PREGABALIN 225 MG/1
1 CAPSULE ORAL
Qty: 0 | Refills: 0 | DISCHARGE

## 2023-05-10 RX ORDER — DULOXETINE HYDROCHLORIDE 30 MG/1
1 CAPSULE, DELAYED RELEASE ORAL
Refills: 0 | DISCHARGE

## 2023-05-10 RX ORDER — HYDROXYZINE HCL 10 MG
1 TABLET ORAL
Refills: 0 | DISCHARGE

## 2023-05-10 RX ORDER — CITALOPRAM 10 MG/1
1 TABLET, FILM COATED ORAL
Refills: 0 | DISCHARGE

## 2023-05-10 RX ORDER — CITALOPRAM 10 MG/1
1 TABLET, FILM COATED ORAL
Qty: 0 | Refills: 0 | DISCHARGE

## 2023-05-10 RX ORDER — SODIUM CHLORIDE 9 MG/ML
3 INJECTION INTRAMUSCULAR; INTRAVENOUS; SUBCUTANEOUS EVERY 8 HOURS
Refills: 0 | Status: DISCONTINUED | OUTPATIENT
Start: 2023-05-10 | End: 2023-05-24

## 2023-05-10 RX ORDER — METOPROLOL TARTRATE 50 MG
1 TABLET ORAL
Qty: 0 | Refills: 0 | DISCHARGE

## 2023-05-10 RX ORDER — HYDROXYZINE HCL 10 MG
1 TABLET ORAL
Qty: 0 | Refills: 0 | DISCHARGE

## 2023-05-10 RX ORDER — ASPIRIN/CALCIUM CARB/MAGNESIUM 324 MG
1 TABLET ORAL
Qty: 0 | Refills: 0 | DISCHARGE

## 2023-05-10 RX ORDER — TRAZODONE HCL 50 MG
1 TABLET ORAL
Refills: 0 | DISCHARGE

## 2023-05-10 RX ORDER — FUROSEMIDE 40 MG
1 TABLET ORAL
Refills: 0 | DISCHARGE

## 2023-05-10 RX ORDER — ALBUTEROL 90 UG/1
3 AEROSOL, METERED ORAL
Refills: 0 | DISCHARGE

## 2023-05-10 RX ORDER — RISPERIDONE 4 MG/1
1 TABLET ORAL
Qty: 0 | Refills: 0 | DISCHARGE

## 2023-05-10 RX ORDER — ALBUTEROL 90 UG/1
2 AEROSOL, METERED ORAL
Qty: 0 | Refills: 0 | DISCHARGE

## 2023-05-10 RX ORDER — PANTOPRAZOLE SODIUM 20 MG/1
1 TABLET, DELAYED RELEASE ORAL
Refills: 0 | DISCHARGE

## 2023-05-10 RX ORDER — RISPERIDONE 4 MG/1
1 TABLET ORAL
Refills: 0 | DISCHARGE

## 2023-05-10 RX ORDER — METOPROLOL TARTRATE 50 MG
1 TABLET ORAL
Refills: 0 | DISCHARGE

## 2023-05-10 RX ORDER — LAMOTRIGINE 25 MG/1
1 TABLET, ORALLY DISINTEGRATING ORAL
Refills: 0 | DISCHARGE

## 2023-05-10 RX ORDER — CHOLECALCIFEROL (VITAMIN D3) 125 MCG
1 CAPSULE ORAL
Qty: 0 | Refills: 0 | DISCHARGE

## 2023-05-10 RX ORDER — CYCLOBENZAPRINE HYDROCHLORIDE 10 MG/1
1 TABLET, FILM COATED ORAL
Refills: 0 | DISCHARGE

## 2023-05-10 RX ORDER — DOCUSATE SODIUM 100 MG
1 CAPSULE ORAL
Qty: 0 | Refills: 0 | DISCHARGE

## 2023-05-10 RX ORDER — ERGOCALCIFEROL 1.25 MG/1
1 CAPSULE ORAL
Refills: 0 | DISCHARGE

## 2023-05-10 RX ORDER — FOLIC ACID 0.8 MG
1 TABLET ORAL
Refills: 0 | DISCHARGE

## 2023-05-10 RX ORDER — ATORVASTATIN CALCIUM 80 MG/1
1 TABLET, FILM COATED ORAL
Qty: 0 | Refills: 0 | DISCHARGE

## 2023-05-10 RX ORDER — FENTANYL CITRATE 50 UG/ML
25 INJECTION INTRAVENOUS ONCE
Refills: 0 | Status: DISCONTINUED | OUTPATIENT
Start: 2023-05-10 | End: 2023-05-10

## 2023-05-10 RX ORDER — FOLIC ACID 0.8 MG
1 TABLET ORAL
Qty: 0 | Refills: 0 | DISCHARGE

## 2023-05-10 RX ORDER — MAGNESIUM OXIDE 400 MG ORAL TABLET 241.3 MG
400 TABLET ORAL
Qty: 0 | Refills: 0 | DISCHARGE

## 2023-05-10 RX ADMIN — FENTANYL CITRATE 25 MICROGRAM(S): 50 INJECTION INTRAVENOUS at 12:22

## 2023-05-10 NOTE — H&P CARDIOLOGY - NSICDXFAMILYHX_GEN_ALL_CORE_FT
FAMILY HISTORY:  Grandparent  Still living? Unknown  Family history of diabetes mellitus in paternal grandfather, Age at diagnosis: Age Unknown

## 2023-05-10 NOTE — H&P CARDIOLOGY - NSICDXPASTSURGICALHX_GEN_ALL_CORE_FT
PAST SURGICAL HISTORY:  H/O bariatric surgery     History of sleeve gastrectomy     S/P cholecystectomy     S/P myomectomy     S/P ventral herniorrhaphy

## 2023-05-10 NOTE — H&P CARDIOLOGY - HISTORY OF PRESENT ILLNESS
68 y/o F w/ PMH of HTN, Obesity, Sciatica, Bipolar and GERD presents for cardiac catheretization. Pt has been experiencing intermittent episodes of left sided chest pain with associated lightheadedness. Pt also becomes short of breath after walking about a half block. Pt was given SL Nitro which she has been using to relieve her symptoms. Pt's stress test was abnormal with reversible defects in the apical + mid to distal lateral walls, transient ischemic dilation of the LV was noted with a ratio of: 1.22 and normal LV systolic function w/ EF 52%. Pt denies N/V/D, fevers, chills, cough, palpitations, substernal distress, syncope, orthopnea, nocturnal paroxysmal dyspnea, edema, cyanosis, heart murmurs, varicosities, phlebitis, claudication.

## 2023-07-06 ENCOUNTER — APPOINTMENT (OUTPATIENT)
Dept: CARDIOLOGY | Facility: CLINIC | Age: 68
End: 2023-07-06
Payer: MEDICARE

## 2023-07-06 VITALS
HEIGHT: 61 IN | DIASTOLIC BLOOD PRESSURE: 96 MMHG | OXYGEN SATURATION: 90 % | TEMPERATURE: 97.8 F | BODY MASS INDEX: 51.73 KG/M2 | WEIGHT: 274 LBS | SYSTOLIC BLOOD PRESSURE: 148 MMHG | RESPIRATION RATE: 18 BRPM | HEART RATE: 80 BPM

## 2023-07-06 VITALS — DIASTOLIC BLOOD PRESSURE: 80 MMHG | SYSTOLIC BLOOD PRESSURE: 160 MMHG

## 2023-07-06 DIAGNOSIS — Z98.890 OTHER SPECIFIED POSTPROCEDURAL STATES: ICD-10-CM

## 2023-07-06 PROCEDURE — 99214 OFFICE O/P EST MOD 30 MIN: CPT

## 2023-07-06 RX ORDER — RISPERIDONE 1 MG/1
1 TABLET ORAL DAILY
Refills: 0 | Status: ACTIVE | COMMUNITY

## 2023-07-06 RX ORDER — METOPROLOL TARTRATE 50 MG/1
50 TABLET, FILM COATED ORAL TWICE DAILY
Refills: 0 | Status: ACTIVE | COMMUNITY

## 2023-07-06 NOTE — HISTORY OF PRESENT ILLNESS
[FreeTextEntry1] : Melissa Rasheed is a 67 year old female with Hypertension, abnormal nuclear stress test  and morbid obesity comes for follow up visit. Still complaining of mild chest tightness and shortness of breath on exertion which is relieved with SL NTG or rest in 15- 20 minutes of couple of months duration. Cardiac cath done showed mild disease, no intervention.

## 2023-07-06 NOTE — DISCUSSION/SUMMARY
[FreeTextEntry1] : In a summary Melissa Rasheed is a middle aged female with chest tightness and shortness of breath on exertion, with abnormal Pharmacological nuclear stress test, s/p cardiac cath showed mild CAD.  Continue Aspirin. Recommend GI work up. Hypertension, uncontrolled.  Continue Metoprolol and Lasix. Not taking Lasix regularly. Lose weight. Follow up in 3 months.

## 2023-07-13 ENCOUNTER — APPOINTMENT (OUTPATIENT)
Dept: CARDIOLOGY | Facility: CLINIC | Age: 68
End: 2023-07-13

## 2023-10-26 ENCOUNTER — APPOINTMENT (OUTPATIENT)
Dept: CARDIOLOGY | Facility: CLINIC | Age: 68
End: 2023-10-26
Payer: MEDICARE

## 2023-10-26 ENCOUNTER — NON-APPOINTMENT (OUTPATIENT)
Age: 68
End: 2023-10-26

## 2023-10-26 ENCOUNTER — APPOINTMENT (OUTPATIENT)
Dept: CARDIOLOGY | Facility: CLINIC | Age: 68
End: 2023-10-26

## 2023-10-26 VITALS
OXYGEN SATURATION: 98 % | DIASTOLIC BLOOD PRESSURE: 73 MMHG | SYSTOLIC BLOOD PRESSURE: 154 MMHG | WEIGHT: 270 LBS | HEIGHT: 61 IN | HEART RATE: 60 BPM | BODY MASS INDEX: 50.98 KG/M2

## 2023-10-26 DIAGNOSIS — R07.89 OTHER CHEST PAIN: ICD-10-CM

## 2023-10-26 PROCEDURE — 99213 OFFICE O/P EST LOW 20 MIN: CPT | Mod: 25

## 2023-10-26 PROCEDURE — 93000 ELECTROCARDIOGRAM COMPLETE: CPT

## 2023-10-26 RX ORDER — ASPIRIN ENTERIC COATED TABLETS 81 MG 81 MG/1
81 TABLET, DELAYED RELEASE ORAL DAILY
Refills: 0 | Status: ACTIVE | COMMUNITY

## 2024-04-25 ENCOUNTER — APPOINTMENT (OUTPATIENT)
Dept: CARDIOLOGY | Facility: CLINIC | Age: 69
End: 2024-04-25
Payer: MEDICARE

## 2024-04-25 ENCOUNTER — NON-APPOINTMENT (OUTPATIENT)
Age: 69
End: 2024-04-25

## 2024-04-25 VITALS
HEIGHT: 61 IN | WEIGHT: 272 LBS | OXYGEN SATURATION: 97 % | RESPIRATION RATE: 18 BRPM | HEART RATE: 64 BPM | BODY MASS INDEX: 51.35 KG/M2 | SYSTOLIC BLOOD PRESSURE: 146 MMHG | DIASTOLIC BLOOD PRESSURE: 78 MMHG

## 2024-04-25 VITALS — DIASTOLIC BLOOD PRESSURE: 78 MMHG | SYSTOLIC BLOOD PRESSURE: 138 MMHG

## 2024-04-25 DIAGNOSIS — R06.02 SHORTNESS OF BREATH: ICD-10-CM

## 2024-04-25 DIAGNOSIS — I10 ESSENTIAL (PRIMARY) HYPERTENSION: ICD-10-CM

## 2024-04-25 PROCEDURE — 93306 TTE W/DOPPLER COMPLETE: CPT

## 2024-04-25 PROCEDURE — 99214 OFFICE O/P EST MOD 30 MIN: CPT | Mod: 25

## 2024-04-25 PROCEDURE — 93000 ELECTROCARDIOGRAM COMPLETE: CPT

## 2024-04-25 NOTE — HISTORY OF PRESENT ILLNESS
[FreeTextEntry1] : Melissa Rasheed is a 68-year-old female with Hypertension and morbid obesity comes for follow up visit. Still complaining of mild chest tightness and shortness of breath on heavy exertion which is relieved with SL NTG or rest in 15- 20 minutes of chronic duration. Cardiac Cath done 5/2023 showed mild disease, no intervention.

## 2024-04-25 NOTE — DISCUSSION/SUMMARY
[FreeTextEntry1] : In a summary Melissa Rasheed is a middle-aged female with chest tightness and shortness of breath on exertion s/p cardiac Cath showed mild CAD.  Continue Aspirin. Recommend GI work up. Hypertension, controlled.  Continue Metoprolol and Lasix.  Cut down on salt intake. Shortness of breath and Hypertension, Echo done showed normal LV systolic function, mild AR and NV. Echo results explained.  Lose weight. Follow up in 4 months. Spent 30 minutes on encounter.

## 2024-08-07 ENCOUNTER — NON-APPOINTMENT (OUTPATIENT)
Age: 69
End: 2024-08-07

## 2024-08-07 ENCOUNTER — APPOINTMENT (OUTPATIENT)
Dept: CARDIOLOGY | Facility: CLINIC | Age: 69
End: 2024-08-07

## 2024-08-07 PROBLEM — I25.10 MILD CAD: Status: ACTIVE | Noted: 2024-08-07

## 2024-08-07 PROCEDURE — 99214 OFFICE O/P EST MOD 30 MIN: CPT | Mod: 25

## 2024-08-07 PROCEDURE — 93000 ELECTROCARDIOGRAM COMPLETE: CPT

## 2024-08-07 NOTE — DISCUSSION/SUMMARY
[FreeTextEntry1] : In a summary Melissa Rasheed is a middle-aged female with shortness of breath on exertion s/p cardiac Cath showed mild CAD.  Continue Aspirin. Hypertension, controlled.  Continue Metoprolol and Lasix.  Cut down on salt intake. Lose weight. Follow up in 4 months. Spent 30 minutes on encounter.

## 2024-08-07 NOTE — HISTORY OF PRESENT ILLNESS
[FreeTextEntry1] : Melissa Rasheed is a 69-year-old female with Hypertension and morbid obesity comes for follow up visit. Complaining of mild chronic shortness of breath on heavy exertion which is relieved with rest in few minutes of chronic duration. Cardiac Cath done 5/2023 showed mild disease, no intervention.

## 2025-01-15 ENCOUNTER — APPOINTMENT (OUTPATIENT)
Dept: CARDIOLOGY | Facility: CLINIC | Age: 70
End: 2025-01-15
Payer: MEDICARE

## 2025-01-15 ENCOUNTER — NON-APPOINTMENT (OUTPATIENT)
Age: 70
End: 2025-01-15

## 2025-01-15 VITALS
HEART RATE: 72 BPM | RESPIRATION RATE: 18 BRPM | TEMPERATURE: 97.3 F | OXYGEN SATURATION: 97 % | HEIGHT: 61 IN | BODY MASS INDEX: 51.35 KG/M2 | DIASTOLIC BLOOD PRESSURE: 72 MMHG | WEIGHT: 272 LBS | SYSTOLIC BLOOD PRESSURE: 136 MMHG

## 2025-01-15 DIAGNOSIS — R06.02 SHORTNESS OF BREATH: ICD-10-CM

## 2025-01-15 DIAGNOSIS — I10 ESSENTIAL (PRIMARY) HYPERTENSION: ICD-10-CM

## 2025-01-15 PROCEDURE — 93000 ELECTROCARDIOGRAM COMPLETE: CPT

## 2025-01-15 PROCEDURE — 99214 OFFICE O/P EST MOD 30 MIN: CPT | Mod: 25

## 2025-04-30 ENCOUNTER — NON-APPOINTMENT (OUTPATIENT)
Age: 70
End: 2025-04-30

## 2025-04-30 ENCOUNTER — APPOINTMENT (OUTPATIENT)
Dept: ELECTROPHYSIOLOGY | Facility: CLINIC | Age: 70
End: 2025-04-30
Payer: MEDICARE

## 2025-04-30 VITALS
DIASTOLIC BLOOD PRESSURE: 80 MMHG | OXYGEN SATURATION: 98 % | SYSTOLIC BLOOD PRESSURE: 128 MMHG | HEART RATE: 65 BPM | BODY MASS INDEX: 52.15 KG/M2 | RESPIRATION RATE: 16 BRPM | WEIGHT: 276 LBS | TEMPERATURE: 97.5 F

## 2025-04-30 DIAGNOSIS — R06.02 SHORTNESS OF BREATH: ICD-10-CM

## 2025-04-30 PROCEDURE — 99214 OFFICE O/P EST MOD 30 MIN: CPT | Mod: 25

## 2025-04-30 PROCEDURE — 93246 EXT ECG>7D<15D RECORDING: CPT

## 2025-04-30 PROCEDURE — 93000 ELECTROCARDIOGRAM COMPLETE: CPT | Mod: XU

## 2025-05-20 ENCOUNTER — APPOINTMENT (OUTPATIENT)
Dept: CV DIAGNOSTICS | Facility: HOSPITAL | Age: 70
End: 2025-05-20

## 2025-05-20 ENCOUNTER — RESULT REVIEW (OUTPATIENT)
Age: 70
End: 2025-05-20

## 2025-05-20 ENCOUNTER — OUTPATIENT (OUTPATIENT)
Dept: OUTPATIENT SERVICES | Facility: HOSPITAL | Age: 70
LOS: 1 days | End: 2025-05-20
Payer: MEDICARE

## 2025-05-20 DIAGNOSIS — Z98.84 BARIATRIC SURGERY STATUS: Chronic | ICD-10-CM

## 2025-05-20 DIAGNOSIS — Z90.3 ACQUIRED ABSENCE OF STOMACH [PART OF]: Chronic | ICD-10-CM

## 2025-05-20 DIAGNOSIS — Z98.890 OTHER SPECIFIED POSTPROCEDURAL STATES: Chronic | ICD-10-CM

## 2025-05-20 DIAGNOSIS — R06.02 SHORTNESS OF BREATH: ICD-10-CM

## 2025-05-20 DIAGNOSIS — Z90.49 ACQUIRED ABSENCE OF OTHER SPECIFIED PARTS OF DIGESTIVE TRACT: Chronic | ICD-10-CM

## 2025-05-20 PROCEDURE — 78451 HT MUSCLE IMAGE SPECT SING: CPT | Mod: 26

## 2025-05-20 PROCEDURE — 93016 CV STRESS TEST SUPVJ ONLY: CPT | Mod: GC

## 2025-05-20 PROCEDURE — 93018 CV STRESS TEST I&R ONLY: CPT | Mod: GC

## 2025-05-27 ENCOUNTER — NON-APPOINTMENT (OUTPATIENT)
Age: 70
End: 2025-05-27

## 2025-05-27 DIAGNOSIS — R06.02 SHORTNESS OF BREATH: ICD-10-CM

## 2025-06-18 ENCOUNTER — APPOINTMENT (OUTPATIENT)
Dept: CARDIOLOGY | Facility: CLINIC | Age: 70
End: 2025-06-18
Payer: MEDICARE

## 2025-06-18 VITALS
HEIGHT: 61 IN | DIASTOLIC BLOOD PRESSURE: 78 MMHG | TEMPERATURE: 97.3 F | BODY MASS INDEX: 52.67 KG/M2 | OXYGEN SATURATION: 99 % | WEIGHT: 279 LBS | HEART RATE: 75 BPM | SYSTOLIC BLOOD PRESSURE: 130 MMHG

## 2025-06-18 PROCEDURE — 93306 TTE W/DOPPLER COMPLETE: CPT

## 2025-06-18 PROCEDURE — 99214 OFFICE O/P EST MOD 30 MIN: CPT | Mod: 25
